# Patient Record
Sex: FEMALE | Race: BLACK OR AFRICAN AMERICAN | NOT HISPANIC OR LATINO | Employment: FULL TIME | URBAN - METROPOLITAN AREA
[De-identification: names, ages, dates, MRNs, and addresses within clinical notes are randomized per-mention and may not be internally consistent; named-entity substitution may affect disease eponyms.]

---

## 2017-04-12 ENCOUNTER — APPOINTMENT (OUTPATIENT)
Dept: LAB | Facility: HOSPITAL | Age: 32
End: 2017-04-12
Attending: MIDWIFE
Payer: COMMERCIAL

## 2017-04-12 ENCOUNTER — OFFICE VISIT (OUTPATIENT)
Dept: OBSTETRICS AND GYNECOLOGY | Facility: CLINIC | Age: 32
End: 2017-04-12
Payer: COMMERCIAL

## 2017-04-12 VITALS
HEIGHT: 66 IN | DIASTOLIC BLOOD PRESSURE: 70 MMHG | SYSTOLIC BLOOD PRESSURE: 122 MMHG | BODY MASS INDEX: 25.66 KG/M2 | WEIGHT: 159.63 LBS

## 2017-04-12 DIAGNOSIS — Z01.419 ENCOUNTER FOR GYNECOLOGICAL EXAMINATION WITH PAPANICOLAOU SMEAR OF CERVIX: Primary | ICD-10-CM

## 2017-04-12 DIAGNOSIS — Z30.09 ENCOUNTER FOR GENERAL COUNSELING AND ADVICE ON CONTRACEPTIVE MANAGEMENT: ICD-10-CM

## 2017-04-12 PROCEDURE — 88141 CYTOPATH C/V INTERPRET: CPT | Mod: ,,, | Performed by: PATHOLOGY

## 2017-04-12 PROCEDURE — 99385 PREV VISIT NEW AGE 18-39: CPT | Mod: S$GLB,,, | Performed by: MIDWIFE

## 2017-04-12 PROCEDURE — 88175 CYTOPATH C/V AUTO FLUID REDO: CPT | Performed by: PATHOLOGY

## 2017-04-12 PROCEDURE — 99999 PR PBB SHADOW E&M-NEW PATIENT-LVL II: CPT | Mod: PBBFAC,,, | Performed by: MIDWIFE

## 2017-04-12 RX ORDER — EPINEPHRINE 0.3 MG/.3ML
INJECTION INTRAMUSCULAR
Refills: 0 | COMMUNITY
Start: 2017-01-10 | End: 2019-09-11 | Stop reason: SDUPTHER

## 2017-04-12 RX ORDER — MONTELUKAST SODIUM 10 MG/1
TABLET ORAL
Refills: 1 | COMMUNITY
Start: 2017-03-28 | End: 2019-02-04

## 2017-04-12 NOTE — PROGRESS NOTES
Subjective:       Magalis Russell is a 31 y.o. female here for a routine exam and contraceptive counseling.  Current complaints: none.  Personal health questionnaire reviewed: yes. Pt had breast exam 2 months ago but had to leave and did not do PAP     Gynecologic History  Patient's last menstrual period was 2017 (approximate).  Contraception: none  Last Pap: . Results were: normal  Last mammogram: N/A. Results were: N/A    Obstetric History  OB History    Para Term  AB SAB TAB Ectopic Multiple Living   2 2 2       2      # Outcome Date GA Lbr Galindo/2nd Weight Sex Delivery Anes PTL Lv   2 Term 08 40w0d   F Vag-Spont   Y   1 Term 02 40w0d   M Vag-Spont   Y            The following portions of the patient's history were reviewed and updated as appropriate: allergies, current medications, past family history, past medical history, past social history, past surgical history and problem list.    Review of Systems  Pertinent items are noted in HPI.      Objective:      General appearance: alert, appears stated age, cooperative and no distress  Abdomen: normal findings: soft, non-tender  Pelvic: cervix normal in appearance, external genitalia normal, no adnexal masses or tenderness, no cervical motion tenderness, uterus normal size, shape, and consistency and vagina normal without discharge      Assessment:      Healthy female exam.      Plan:   Discussed R/B/A on IUD's  Also discussed BTL per pt request   Education reviewed: self breast exams.  Contraception: info given on Mirena, Kyleena, and Jolie, will decide and call.  Follow up in: 1 year.  Paper signed for IUD

## 2017-04-12 NOTE — MR AVS SNAPSHOT
"    [facility]  [address] [address] [address]  Phone: [phone]                  Magalis Russell   2017 7:45 AM   Office Visit    Description:  Female : 1985   Provider:  Panfilo Huertas CNM   Department:  [facility]           Reason for Visit     Gynecologic Exam           Diagnoses this Visit        Comments    Encounter for gynecological examination with Papanicolaou smear of cervix    -  Primary     Encounter for general counseling and advice on contraceptive management                To Do List           Goals (5 Years of Data)     None      [facility]     [facility] On Call Nurse Care Line -  Assistance  Unless otherwise directed by your provider, please contact [facility], our nurse care line that is available for  assistance.     Registered nurses in the [facility] On Call Center provide: appointment scheduling, clinical advisement, health education, and other advisory services.  Call: [phone] (toll free)               Medications           Message regarding Medications     Verify the changes and/or additions to your medication regime listed below are the same as discussed with your clinician today.  If any of these changes or additions are incorrect, please notify your healthcare provider.             Verify that the below list of medications is an accurate representation of the medications you are currently taking.  If none reported, the list may be blank. If incorrect, please contact your healthcare provider. Carry this list with you in case of emergency.           Current Medications     montelukast (SINGULAIR) 10 mg tablet     EPIPEN 2-MARCELA 0.3 mg/0.3 mL AtIn            Clinical Reference Information           Your Vitals Were     BP Height Weight Last Period BMI    122/70 5' 6" (1.676 m) 72.4 kg (159 lb 9.8 oz) 2017 (Approximate) 25.76 kg/m2      Blood Pressure          Most Recent Value    BP  122/70      Allergies as of 2017     " Oranges [Orange]    [place]      Immunizations Administered on Date of Encounter - 4/12/2017     None      Instructions      Breast Health: Breast Self-Awareness  What is breast self-awareness?  Breast self-awareness is knowing how your breasts normally look and feel. Your breasts change as you go through different stages of your life. So its important to learn what is normal for your breasts. Breast self-awareness helps you notice any changes in your breasts right away. Report any changes to your healthcare provider.  Why is breast self-awareness important?  Many experts now say that women should focus on breast self-awareness instead of doing a breast self-examination (BSE). These experts include the American Cancer Society, the U.S. Preventive Services Task Force, and the American Congress of Obstetricians and Gynecologists. Some experts even advise not teaching women to do a BSE. Thats because research hasnt shown a clear benefit to doing BSEs.  Breast self-awareness is different than a BSE. Breast self-awareness isnt about following a certain method and schedule. Its about knowing what's normal for your breasts. That way you can notice even small changes right away. If you see any changes, report them to your healthcare provider.  Changes to look for  Call your healthcare provider if you find any changes in your breasts that concern you. These changes may include:  · A lump  · Nipple discharge other than breast milk, especially a bloody discharge  · Swelling  · A change in size or shape  · Skin irritation, such as redness, thickening, or dimpling of the skin  · Swollen lymph nodes in the armpit  · Nipple problems, such as pain or redness  If you find a lump  Contact your provider if you find lumpiness in one breast, feel something different in the tissue, or feel a definite lump. Sometimes lumpiness may be due to menstrual changes. But there may be reason for concern.  Your provider may want to see you  right away if you have:  · Nipple discharge that is bloody  · Skin changes on your breast, such as dimpling or puckering  Its normal to be upset if you find a lump. But its important to contact your provider right away. Remember that most breast lumps are benign. This means they are not cancer.  Date Last Reviewed: 8/10/2015  © 5934-2109 [employer]. [address] [place] [address], [place] [address]. All rights reserved. This information is not intended as a substitute for professional medical care. Always follow your healthcare professional's instructions.        Clinical Breast Exam    Many health organizations recommend a yearly clinical breast exam. This exam may be done by a gynecologist, family healthcare provider, nurse practitioner, or specially trained nurse. Yearly breast exams help to make sure that breast conditions are found early.  Your healthcare providers role  A healthcare professional knows the tests and follow-up care needed if a problem is found. Your clinical exam is also a great time to ask questions about breast self-exams. You can find out if youre checking your breasts in the best way. Or you may want to ask how pregnancy, breast implants, or breast reduction surgery affect the way you should check your breasts.  Diagnostic tests  If a clinical exam reveals a breast change, you may have other tests to find out more. These tests may include:  · Mammography. A low-dose X-ray of your breast tissue.  · Ultrasound. An imaging test that uses sound waves to create images of your breast.  · Biopsy. A small amount of breast tissue is removed by needle or by a cut (incision). The tissue is then checked under a microscope.  Guidelines for having clinical breast exams  The American College of Obstetricians and Gynecologists recommends that starting at age 29, you should have a clinical breast exam every 1 to 3 years. After age 40, have a clinical breast exam each year. If youre at higher risk  for breast cancer, you may need exams more often. Risk factors for breast cancer may include:  · Being over 50 or postmenopausal  · Having a family history of breast cancer  · Having the BRCA1 or BRCA2 gene mutation or certain other gene mutations  · Having more menstrual periods due to starting menstruation early (before age 12) or having a late menopause (after age 55)  · Having no pregnancies  · Having a first pregnancy after age 30  · Being obese  · Having a history of radiation treatment to your chest area  · Exposure to JUANCHO during your mother's pregnancy  · Not being active  · Drinking too much alcohol  · Having dense breast tissue  · Taking hormone therapy after menopause  Other health organizations have different recommendations. Talk to your healthcare provider about what is best for you.   Date Last Reviewed: 8/13/2015 © 2000-2016 [employer]. [address] [place] [address], [place] [address]. All rights reserved. This information is not intended as a substitute for professional medical care. Always follow your healthcare professional's instructions.        Birth Control: IUD (Intrauterine Device)    The IUD (intrauterine device) is small, flexible, and T-shaped. It is placed in the uterus by a trained healthcare provider. The IUD is one of the most effective birth control methods. It is reversible, which means it can be removed at any time by a trained healthcare provider. New IUDs are safe and do not have the risks of older types of IUDs.  Pregnancy rates  Talk to your healthcare provider about the effectiveness of this birth control method.  Types of IUDs  IUD insertion is done in the healthcare providers office. Two types of IUDs are available:  · The copper IUD releases a small amount of copper into the uterus. The copper makes it harder for sperm to reach the egg. The device works for at least 10 years.  · The progestin IUD releases a hormone called progestin. It causes changes in the uterus  to help prevent pregnancy. The device works for 3 to 5 years, depending on which device is chosen. It may be recommended for women who have anemia or heavy and painful periods.  IUDs have thin strings that hang from the opening of the uterus into the vagina. This lets you check that the IUD stays in place.  Things to know about IUDs  · Can be used by women who have never been pregnant or by women with a history of sexually transmitted infections (STIs) or tubal pregnancy.  · Wont move from the uterus to any other part of the body.  · Pose a slight risk of the device coming out of the vagina (expulsion).  · May not work in women who have an abnormally-shaped uterus.  · Copper IUD may cause heavier periods and cramping.  · Progestin IUD may cause light periods or no periods at all (irregular bleeding or spotting is possible and normal during first 3 to 6 months).  · If a sexually transmitted infection is acquired with an IUD in place symptoms may be more severe.  Be sure your healthcare provider knows if you have:  · A sexually transmitted infection (STI) or possible STI  · Liver problems  · Blood clots (for progestin IUD only)  · Breast cancer or a history of breast cancer (progestin IUD only)   Date Last Reviewed: 5/12/2015  © 2522-7148 [employer]. [address] [place] [address]. All rights reserved. This information is not intended as a substitute for professional medical care. Always follow your healthcare professional's instructions.             Language Assistance Services     ATTENTION: Language assistance services are available, free of charge. Please call [phone].      ATENCIÓN: Si habla kimi, tiene a simon disposición servicios gratuitos de asistencia lingüística. Llame al [phone].     [facility] Ý: N?u b?n nói Ti?ng Vi?t, có các d?ch v? h? tr? ngôn ng? mi?n phí dành cho b?n. G?i s? [phone].         [facility] complies with applicable Federal civil rights laws  and does not discriminate on the basis of race, color, national origin, age, disability, or sex.

## 2017-04-12 NOTE — PATIENT INSTRUCTIONS
Breast Health: Breast Self-Awareness  What is breast self-awareness?  Breast self-awareness is knowing how your breasts normally look and feel. Your breasts change as you go through different stages of your life. So its important to learn what is normal for your breasts. Breast self-awareness helps you notice any changes in your breasts right away. Report any changes to your healthcare provider.  Why is breast self-awareness important?  Many experts now say that women should focus on breast self-awareness instead of doing a breast self-examination (BSE). These experts include the American Cancer Society, the U.S. Preventive Services Task Force, and the American Congress of Obstetricians and Gynecologists. Some experts even advise not teaching women to do a BSE. Thats because research hasnt shown a clear benefit to doing BSEs.  Breast self-awareness is different than a BSE. Breast self-awareness isnt about following a certain method and schedule. Its about knowing what's normal for your breasts. That way you can notice even small changes right away. If you see any changes, report them to your healthcare provider.  Changes to look for  Call your healthcare provider if you find any changes in your breasts that concern you. These changes may include:  · A lump  · Nipple discharge other than breast milk, especially a bloody discharge  · Swelling  · A change in size or shape  · Skin irritation, such as redness, thickening, or dimpling of the skin  · Swollen lymph nodes in the armpit  · Nipple problems, such as pain or redness  If you find a lump  Contact your provider if you find lumpiness in one breast, feel something different in the tissue, or feel a definite lump. Sometimes lumpiness may be due to menstrual changes. But there may be reason for concern.  Your provider may want to see you right away if you have:  · Nipple discharge that is bloody  · Skin changes on your breast, such as dimpling or puckering  Its  normal to be upset if you find a lump. But its important to contact your provider right away. Remember that most breast lumps are benign. This means they are not cancer.  Date Last Reviewed: 8/10/2015  © 6561-1674 [employer]. [address] [place] [address]. All rights reserved. This information is not intended as a substitute for professional medical care. Always follow your healthcare professional's instructions.        Clinical Breast Exam    Many health organizations recommend a yearly clinical breast exam. This exam may be done by a gynecologist, family healthcare provider, nurse practitioner, or specially trained nurse. Yearly breast exams help to make sure that breast conditions are found early.  Your healthcare providers role  A healthcare professional knows the tests and follow-up care needed if a problem is found. Your clinical exam is also a great time to ask questions about breast self-exams. You can find out if youre checking your breasts in the best way. Or you may want to ask how pregnancy, breast implants, or breast reduction surgery affect the way you should check your breasts.  Diagnostic tests  If a clinical exam reveals a breast change, you may have other tests to find out more. These tests may include:  · Mammography. A low-dose X-ray of your breast tissue.  · Ultrasound. An imaging test that uses sound waves to create images of your breast.  · Biopsy. A small amount of breast tissue is removed by needle or by a cut (incision). The tissue is then checked under a microscope.  Guidelines for having clinical breast exams  The American College of Obstetricians and Gynecologists recommends that starting at age 29, you should have a clinical breast exam every 1 to 3 years. After age 40, have a clinical breast exam each year. If youre at higher risk for breast cancer, you may need exams more often. Risk factors for breast cancer may include:  · Being over 50 or  postmenopausal  · Having a family history of breast cancer  · Having the BRCA1 or BRCA2 gene mutation or certain other gene mutations  · Having more menstrual periods due to starting menstruation early (before age 12) or having a late menopause (after age 55)  · Having no pregnancies  · Having a first pregnancy after age 30  · Being obese  · Having a history of radiation treatment to your chest area  · Exposure to JUANCHO during your mother's pregnancy  · Not being active  · Drinking too much alcohol  · Having dense breast tissue  · Taking hormone therapy after menopause  Other health organizations have different recommendations. Talk to your healthcare provider about what is best for you.   Date Last Reviewed: 8/13/2015 © 2000-2016 [employer]. [address] [place] [address], [place] [address]. All rights reserved. This information is not intended as a substitute for professional medical care. Always follow your healthcare professional's instructions.        Birth Control: IUD (Intrauterine Device)    The IUD (intrauterine device) is small, flexible, and T-shaped. It is placed in the uterus by a trained healthcare provider. The IUD is one of the most effective birth control methods. It is reversible, which means it can be removed at any time by a trained healthcare provider. New IUDs are safe and do not have the risks of older types of IUDs.  Pregnancy rates  Talk to your healthcare provider about the effectiveness of this birth control method.  Types of IUDs  IUD insertion is done in the healthcare providers office. Two types of IUDs are available:  · The copper IUD releases a small amount of copper into the uterus. The copper makes it harder for sperm to reach the egg. The device works for at least 10 years.  · The progestin IUD releases a hormone called progestin. It causes changes in the uterus to help prevent pregnancy. The device works for 3 to 5 years, depending on which device is chosen. It may be  recommended for women who have anemia or heavy and painful periods.  IUDs have thin strings that hang from the opening of the uterus into the vagina. This lets you check that the IUD stays in place.  Things to know about IUDs  · Can be used by women who have never been pregnant or by women with a history of sexually transmitted infections (STIs) or tubal pregnancy.  · Wont move from the uterus to any other part of the body.  · Pose a slight risk of the device coming out of the vagina (expulsion).  · May not work in women who have an abnormally-shaped uterus.  · Copper IUD may cause heavier periods and cramping.  · Progestin IUD may cause light periods or no periods at all (irregular bleeding or spotting is possible and normal during first 3 to 6 months).  · If a sexually transmitted infection is acquired with an IUD in place symptoms may be more severe.  Be sure your healthcare provider knows if you have:  · A sexually transmitted infection (STI) or possible STI  · Liver problems  · Blood clots (for progestin IUD only)  · Breast cancer or a history of breast cancer (progestin IUD only)   Date Last Reviewed: 5/12/2015  © 8245-4962 [employer]. [address] [place] [address], [place] [address]. All rights reserved. This information is not intended as a substitute for professional medical care. Always follow your healthcare professional's instructions.

## 2017-06-07 ENCOUNTER — OFFICE VISIT (OUTPATIENT)
Dept: OBSTETRICS AND GYNECOLOGY | Facility: CLINIC | Age: 32
End: 2017-06-07
Payer: COMMERCIAL

## 2017-06-07 VITALS
BODY MASS INDEX: 25.97 KG/M2 | HEIGHT: 66 IN | DIASTOLIC BLOOD PRESSURE: 60 MMHG | WEIGHT: 161.63 LBS | SYSTOLIC BLOOD PRESSURE: 112 MMHG

## 2017-06-07 DIAGNOSIS — O26.841 UTERINE SIZE-DATE DISCREPANCY, FIRST TRIMESTER: ICD-10-CM

## 2017-06-07 DIAGNOSIS — Z32.01 POSITIVE PREGNANCY TEST: Primary | ICD-10-CM

## 2017-06-07 DIAGNOSIS — N91.2 AMENORRHEA: ICD-10-CM

## 2017-06-07 PROCEDURE — 87591 N.GONORRHOEAE DNA AMP PROB: CPT

## 2017-06-07 PROCEDURE — 99999 PR PBB SHADOW E&M-EST. PATIENT-LVL III: CPT | Mod: PBBFAC,,, | Performed by: MIDWIFE

## 2017-06-07 PROCEDURE — 81025 URINE PREGNANCY TEST: CPT | Mod: QW,S$GLB,, | Performed by: MIDWIFE

## 2017-06-07 PROCEDURE — 99212 OFFICE O/P EST SF 10 MIN: CPT | Mod: S$GLB,,, | Performed by: MIDWIFE

## 2017-06-07 NOTE — PROGRESS NOTES
"Subjective:      Magalis Russell is a 31 y.o. female who presents for evaluation of amenorrhea. She believes she could be pregnant. Pregnancy is not desired. Sexual Activity: single partner, contraception: none. Current symptoms also include: positive home pregnancy test. Last period was normal. Pt tearful and unsure if wants to keep pregnancy. Is going on a trip to the [facility] next week.     Patient's last menstrual period was 04/22/2017 (approximate).  The following portions of the patient's history were reviewed and updated as appropriate: allergies, current medications, past family history, past medical history, past social history, past surgical history and problem list.    Review of Systems  Pertinent items are noted in HPI.       Objective:      /60   Ht 5' 6" (1.676 m)   Wt 73.3 kg (161 lb 9.6 oz)   LMP 04/22/2017 (Approximate)   BMI 26.08 kg/m²   General: alert, cooperative, no distress and tearful      Lab Review  Urine HCG: positive      Assessment:      Absence of menstruation.       Plan:     Had long discussion on pt options regarding termination vs keeping the pregnancy  Pt will call when returns from vacation and let us know of her plan, pt reassured and comforted  "

## 2017-06-08 LAB
C TRACH DNA SPEC QL NAA+PROBE: NOT DETECTED
N GONORRHOEA DNA SPEC QL NAA+PROBE: NOT DETECTED

## 2017-07-17 ENCOUNTER — TELEPHONE (OUTPATIENT)
Dept: OBSTETRICS AND GYNECOLOGY | Facility: CLINIC | Age: 32
End: 2017-07-17

## 2017-07-17 ENCOUNTER — PATIENT MESSAGE (OUTPATIENT)
Dept: OBSTETRICS AND GYNECOLOGY | Facility: CLINIC | Age: 32
End: 2017-07-17

## 2017-07-17 NOTE — TELEPHONE ENCOUNTER
----- Message from Theron Varner sent at 7/17/2017 12:27 PM CDT -----  Contact: pt   States she is calling to have  call her back rg her last visit and having some questions about it and can be reached at [phone]//thanks/dbw

## 2017-07-17 NOTE — TELEPHONE ENCOUNTER
----- Message from Connie Graves sent at 7/17/2017  3:56 PM CDT -----  returned call...[phone] (home)

## 2017-07-31 ENCOUNTER — LAB VISIT (OUTPATIENT)
Dept: LAB | Facility: HOSPITAL | Age: 32
End: 2017-07-31
Attending: FAMILY MEDICINE
Payer: COMMERCIAL

## 2017-07-31 ENCOUNTER — OFFICE VISIT (OUTPATIENT)
Dept: INTERNAL MEDICINE | Facility: CLINIC | Age: 32
End: 2017-07-31
Payer: COMMERCIAL

## 2017-07-31 VITALS
TEMPERATURE: 96 F | DIASTOLIC BLOOD PRESSURE: 85 MMHG | HEIGHT: 66 IN | OXYGEN SATURATION: 100 % | HEART RATE: 86 BPM | BODY MASS INDEX: 25.51 KG/M2 | RESPIRATION RATE: 18 BRPM | SYSTOLIC BLOOD PRESSURE: 140 MMHG | WEIGHT: 158.75 LBS

## 2017-07-31 DIAGNOSIS — J30.2 CHRONIC SEASONAL ALLERGIC RHINITIS, UNSPECIFIED TRIGGER: ICD-10-CM

## 2017-07-31 DIAGNOSIS — Z00.00 ROUTINE HEALTH MAINTENANCE: Primary | ICD-10-CM

## 2017-07-31 DIAGNOSIS — Z00.00 ROUTINE HEALTH MAINTENANCE: ICD-10-CM

## 2017-07-31 DIAGNOSIS — F41.9 ANXIETY: ICD-10-CM

## 2017-07-31 LAB
ALBUMIN SERPL BCP-MCNC: 3.5 G/DL
ALP SERPL-CCNC: 58 U/L
ALT SERPL W/O P-5'-P-CCNC: 12 U/L
ANION GAP SERPL CALC-SCNC: 8 MMOL/L
AST SERPL-CCNC: 13 U/L
BILIRUB SERPL-MCNC: 0.8 MG/DL
BUN SERPL-MCNC: 15 MG/DL
CALCIUM SERPL-MCNC: 9 MG/DL
CHLORIDE SERPL-SCNC: 109 MMOL/L
CHOLEST/HDLC SERPL: 2.5 {RATIO}
CO2 SERPL-SCNC: 22 MMOL/L
CREAT SERPL-MCNC: 0.8 MG/DL
EST. GFR  (AFRICAN AMERICAN): >60 ML/MIN/1.73 M^2
EST. GFR  (NON AFRICAN AMERICAN): >60 ML/MIN/1.73 M^2
GLUCOSE SERPL-MCNC: 99 MG/DL
HDL/CHOLESTEROL RATIO: 40.5 %
HDLC SERPL-MCNC: 121 MG/DL
HDLC SERPL-MCNC: 49 MG/DL
LDLC SERPL CALC-MCNC: 53 MG/DL
NONHDLC SERPL-MCNC: 72 MG/DL
POTASSIUM SERPL-SCNC: 3.8 MMOL/L
PROT SERPL-MCNC: 7.1 G/DL
SODIUM SERPL-SCNC: 139 MMOL/L
TRIGL SERPL-MCNC: 95 MG/DL

## 2017-07-31 PROCEDURE — 80061 LIPID PANEL: CPT

## 2017-07-31 PROCEDURE — 99385 PREV VISIT NEW AGE 18-39: CPT | Mod: S$GLB,,, | Performed by: FAMILY MEDICINE

## 2017-07-31 PROCEDURE — 99999 PR PBB SHADOW E&M-EST. PATIENT-LVL IV: CPT | Mod: PBBFAC,,, | Performed by: FAMILY MEDICINE

## 2017-07-31 PROCEDURE — 80053 COMPREHEN METABOLIC PANEL: CPT | Mod: PO

## 2017-07-31 PROCEDURE — 36415 COLL VENOUS BLD VENIPUNCTURE: CPT | Mod: PO

## 2017-07-31 NOTE — PROGRESS NOTES
Subjective:       Patient ID: Magalis Russell is a 31 y.o. female.    Chief Complaint: Establish Care and Sinus Problem    HPI  Here today to establish care.  No acute complaints at this time but she is interested in having a routine physical.  She was pregnant earlier this year but had a termination and is now on birth control.  One of her main concerns is frequent ALLERGY symptoms.  She has tried multiple therapies of over-the-counter remedies without much relief.  She is currently using Singulair.  In the past she take his steroid injections but without only had about 2 weeks of relief.  Has never had any sort of suppressive therapy for ALLERGIES.  Did consult with an ENT and allergist and reportedly has some skin testing that showed strawberries had a significant response but she is not sure of the rest of these results.    Did report a couple episodes of tachycardia or palpitations that lasted about a minute and were not associated with any dizziness nor shortness of breath nor chest pain.  Whenever they came on she simply sat down and they went away.  She is not sure what stimulated this.  Says that she sleeps pretty well.  She does drink about 2-3 20 ounce [employer] products on a daily basis.    She works at a .    Family History   Problem Relation Age of Onset    Heart failure Mother     Aneurysm Father     Breast cancer Sister        Current Outpatient Prescriptions:     EPIPEN 2-MARCELA 0.3 mg/0.3 mL AtIn, , Disp: , Rfl: 0    montelukast (SINGULAIR) 10 mg tablet, , Disp: , Rfl: 1    NORGESTIMATE-ETHINYL ESTRADIOL (SPRINTEC, 28, ORAL), Take 1 tablet by mouth once daily., Disp: , Rfl:     Review of Systems   Constitutional: Negative for chills and fever.   HENT: Negative for congestion and sore throat.    Eyes: Negative for visual disturbance.   Respiratory: Negative for cough and shortness of breath.    Cardiovascular: Negative for chest pain.   Gastrointestinal: Negative for abdominal pain,  "constipation and diarrhea.   Endocrine: Negative for polydipsia and polyuria.   Genitourinary: Negative for difficulty urinating and menstrual problem.   Skin: Negative for rash.   Neurological: Negative for dizziness.   Hematological: Does not bruise/bleed easily.       Objective:   BP (!) 140/85 (BP Location: Left arm, Patient Position: Sitting, BP Method: Automatic)   Pulse 86   Temp 96 °F (35.6 °C) (Tympanic)   Resp 18   Ht 5' 6" (1.676 m)   Wt 72 kg (158 lb 11.7 oz)   LMP 07/02/2017   SpO2 100%   BMI 25.62 kg/m²      Physical Exam   Constitutional: She is oriented to person, place, and time. She appears well-developed and well-nourished. No distress.   HENT:   Head: Normocephalic and atraumatic.   Nose: Nose normal.   Eyes: Conjunctivae and EOM are normal. Pupils are equal, round, and reactive to light. Right eye exhibits no discharge. Left eye exhibits no discharge.   Neck: No thyromegaly present.   Cardiovascular: Normal rate and regular rhythm.    No murmur heard.  Pulmonary/Chest: Effort normal and breath sounds normal. No respiratory distress.   Abdominal: Soft. She exhibits no distension.   Musculoskeletal: She exhibits no edema.   Neurological: She is alert and oriented to person, place, and time. Coordination normal.   Skin: Skin is warm and dry. No rash noted. She is not diaphoretic.   Psychiatric: She has a normal mood and affect. Her behavior is normal.   Vitals reviewed.      Assessment & Plan     1. Routine health maintenance  No significant concerns from history and physical exam.  She had borderline blood pressure today but in the last 2 exams it was actually normal.  We'll get lipid screening as well as comprehensive metabolic panel.  - Lipid panel; Future  - Comprehensive metabolic panel; Future    2. Chronic seasonal allergic rhinitis, unspecified trigger  Due to the fact that she has significant symptoms on a near daily basis and has been resistant to taking many of the " over-the-counter remedies and says that she needed steroids in the past I recommended that she consult with allergist to see if there is something different we can do to help suppress her symptoms.  - Ambulatory Referral to Allergy    3. Anxiety  Reports that her anxiety is intermittent and not really affecting her life.  We'll address further in the future if need be.  Not sure if the tachycardia symptoms that she was describing could've been secondary to this but if this persists or she has more symptoms of palpitations I would recommend that we investigate further

## 2017-07-31 NOTE — MEDICAL/APP STUDENT
[facility] Internal Medicine  History & Physical      SUBJECTIVE:     Chief Complaint/Reason for Admission: [facility]    History of Present Illness:  Patient is a 31 y.o. female with a history of seasonal allergies and anxiety who presented to [facility] and have a wellness exam. She has a couple minor complaints, including two recent episodes of palpitations and long-standing seasonal allergies.    The palpitations occurred twice over this past year, lasted less than a minute, and were associated with minor exertion (cleaning the house). They resolved when she took a seat. She had no chest pain or shortness of breath during these episodes. She has had similar, but more minor episodes in the past. She acknowledges a past history of panic attacks and minor general anxiety. She has never taken medication for her anxiety.    She has had year-round seasonal allergies since she was in her teens. Her symptoms include headache 3-4 days of the week, runny nose, and congestion. She takes benadryl and ibuprofen most days of the week. She has tried 'everything' for her allergies, including zyrtec, flonase, and saline washes, with no relief. She has had steroid shots in the past which give roughly two weeks of relief. She has been seen by an allergist and an ENT, but they did not offer any management options beyond what her generalist offered. She has had allergen sensitivity testing in the past, but never any desensitization therapy.    Review of patient's allergies indicates:   Allergen Reactions    Strawberry Shortness Of Breath and Blisters    Oranges [orange]     Wheat containing prod Hives       Past Medical History:   Diagnosis Date    Seasonal allergies      No past surgical history on file.  Family History   Problem Relation Age of Onset    Heart failure Mother     Aneurysm Father     Breast cancer Sister      Social History   Substance Use Topics    Smoking status: Never Smoker    Smokeless tobacco:  "Never Used    Alcohol use No        Review of Systems:  Constitutional- No fatigue. No fever.  ENT- Runny nose present. No epistaxis. No sore throat.   CV- No chest pain. No edema. No orthopnea.  Resp- No SOB. No cough.  GI- No abdominal pain. No changes in bowl motions. No nausea or vomiting.  - No pain with urination or changes in color or frequency.   MSK- No chronic pain. No recent trauma.  Neuro- Headache 3-4 days a week.  Psych- Some anxiety. No depression.    OBJECTIVE:     Vital Signs (Most Recent):  Temp: 96 °F (35.6 °C) (07/31/17 0833)  Pulse: 86 (07/31/17 0833)  Resp: 18 (07/31/17 0833)  BP: (!) 140/85 (07/31/17 0833)  SpO2: 100 % (07/31/17 0833)    Last 3 sets of Vitals    Vitals - 1 value per visit 4/12/2017 6/7/2017 7/31/2017   SYSTOLIC 122 112 140   DIASTOLIC 70 60 85   PULSE - - 86   TEMPERATURE - - 96   RESPIRATIONS - - 18   SPO2 - - 100   Weight (lb) 159.61 161.6 158.73   Weight (kg) 72.4 73.3 72   HEIGHT 5' 6" 5' 6" 5' 6"   BODY MASS INDEX 25.76 26.08 25.62   VISIT REPORT - - -   Pain Score  - - 8       Physical Exam:  General- Patient alert and oriented x3 in NAD  HEENT- NC/AT, PERRLA, MMM  Neck- No JVD, Lymphadenopathy, Thyromegaly  CV- Regular rate and rhythm, No Murmur/jeffrey/rubs  Resp- Lungs CTA Bilaterally, No increased WOB  Abdomen- Non tender/non-distended, BS normoactive x4 quads, no HSM  Extrem- No cyanosis, clubbing, edema. Pulses 2+ and symmetric  Skin- No rashes, lesions, ulcers    Laboratory Data:      Diagnostic Results:      ASSESSMENT/PLAN:     Ms. Magalis Russell is a 31 y.o. who appears well.    #Seasonal allergies: Referral to [facility] allergist for evaluation of treatment options.    #Palpitations: Patient appears well, low index of suspicion for serious pathology. Monitor.    #Anxiety: Monitor, patient is not concerned enough to warrant medical therapy at this time.    #Health maintenance: Lipid panel.    Follow up: 1 year    Edward Misulis [employer] Medical Student    "

## 2017-07-31 NOTE — PATIENT INSTRUCTIONS
Prevention Guidelines, Women Ages 18 to 39  Screening tests and vaccines are an important part of managing your health. Health counseling is essential, too. Below are guidelines for these, for women ages 18 to 39. Talk with your healthcare provider to make sure youre up-to-date on what you need.  Screening Who needs it How often   Alcohol misuse All women in this age group At routine exams   Blood pressure All women in this age group Every 2 years if your blood pressure is less than 120/80 mm Hg; yearly if your systolic blood pressure is 120 to 139 mm Hg, or your diastolic blood pressure reading is 80 to 89 mm Hg   Breast cancer All women in this age group should talk with their healthcare providers about the need for clinical breast exams (CBE)1 Clinical breast exam every 3 years1   Cervical cancer Women ages 21 and older Women between ages 21 and 29 should have a Pap test every 3 years; women between ages 30 and 65 are advised to have a Pap test plus an HPV test every 5 years   Chlamydia Sexually active women ages 24 and younger, and women at increased risk for infection Every 3 years if you're at risk or have symptoms   Depression All women in this age group At routine exams   Diabetes mellitus, type 2 Adults with no symptoms who are overweight or obese and have 1 or more other risk factors for diabetes At least every 3 years   Gonorrhea Sexually active women at increased risk for infection At routine exams   Hepatitis C Anyone at increased risk At routine exams   HIV All women At routine exams   Obesity All women in this age group At routine exams   Syphilis Women at increased risk for infection - talk with your healthcare provider At routine exams   Tuberculosis Women at increased risk for infection - talk with your healthcare provider Ask your healthcare provider   Vision All women in this age group At least 1 complete exam in your 20s, and 2 in your 30s   Vaccine2 Who needs it How often   Chickenpox  (varicella) All women in this age group who have no record of this infection or vaccine 2 doses; the second dose should be given 4 to 8 weeks after the first dose   Hepatitis A Women at increased risk for infection - talk with your healthcare provider 2 doses given at least 6 months apart   Hepatitis B Women at increased risk for infection - talk with your healthcare provider 3 doses over 6 months; second dose should be given 1 month after the first dose; the third dose should be given at least 2 months after the second dose and at least 4 months after the first dose   Haemophilus influenzae Type B (HIB) Women at increased risk for infection - talk with your healthcare provider 1 to 3 doses   Human papillomavirus (HPV) All women in this age group up to age 26 3 doses; the second dose should be given 1 to 2 months after the first dose and the third dose given 6 months after the first dose   Influenza (flu) All women in this age group Once a year   Measles, mumps, rubella (MMR) All women in this age group who have no record of these infections or vaccines 1 or 2 doses   Meningococcal Women at increased risk for infection - talk with your healthcare provider 1 or more doses   Pneumococcal conjugate vaccine (PCV13) and pneumococcal polysaccharide vaccine (PPSV23) Women at increased risk for infection - talk with your healthcare provider PCV13: 1 dose ages 19 to 65 (protects against 13 types of pneumococcal bacteria)  PPSV23: 1 to 2 doses through age 64, or 1 dose at 65 or older (protects against 23 types of pneumococcal bacteria)      Tetanus/diphtheria/pertussis (Td/Tdap) booster All women in this age group Td every 10 years, or a one-time dose of Tdap instead of a Td booster after age 18, then Td every 10 years   Counseling Who needs it How often   BRCA gene mutation testing for breast and ovarian cancer susceptibility Women with increased risk for having gene mutation When your risk is known   Breast cancer and  chemoprevention Women at high risk for breast cancer When your risk is known   Diet and exercise Women who are overweight or obese When diagnosed, and then at routine exams   Domestic violence Women at the age in which they are able to have children At routine exams   Sexually transmitted infection prevention Women who are sexually active At routine exams   Skin cancer Prevention of skin cancer in fair-skinned adults through age 24 At routine exams   Use of tobacco and the health effects it can cause All women in this age group Every visit   1According to the ACS, women ages 20 to 39 years should have a clinical breast exam (CBE) as part of their routine health exam every 3 years, and breast self-exams are an option for women starting in their 20s. However, the  USPSTF does not recommend CBE.  2Those who are 18 years of age, who are not up-to-date on their childhood immunizations, should receive all appropriate catch-up vaccines recommended by the CDC.  Date Last Reviewed: 8/27/2015  © 9066-4776 [employer]. [address] [place] [address], [place] [address]. All rights reserved. This information is not intended as a substitute for professional medical care. Always follow your healthcare professional's instructions.

## 2017-08-01 ENCOUNTER — TELEPHONE (OUTPATIENT)
Dept: INTERNAL MEDICINE | Facility: CLINIC | Age: 32
End: 2017-08-01

## 2017-08-01 NOTE — TELEPHONE ENCOUNTER
----- Message from Emmanuel Coulter, DO sent at 8/1/2017  8:06 AM CDT -----  Please inform patient that all of the labs are normal if he/she does not have [facility] activated. If there are any questions please let me know.

## 2018-08-30 ENCOUNTER — PATIENT MESSAGE (OUTPATIENT)
Dept: OBSTETRICS AND GYNECOLOGY | Facility: CLINIC | Age: 33
End: 2018-08-30

## 2018-08-30 ENCOUNTER — TELEPHONE (OUTPATIENT)
Dept: OBSTETRICS AND GYNECOLOGY | Facility: CLINIC | Age: 33
End: 2018-08-30

## 2018-08-30 NOTE — TELEPHONE ENCOUNTER
----- Message from Lizzette Cano sent at 8/30/2018  9:02 AM CDT -----  Contact: NCR [employer]  ----- Message from [facility], System Message sent at 8/30/2018  9:00 AM CDT -----    Appointment Request From: Magalis Russell    With Provider: Panfilo Huertas CNM [[facility] - OB-GYN]    Preferred Date Range: 9/3/2018 - 9/4/2018    Preferred Times: Monday Morning, Monday Afternoon, Tuesday Morning, Tuesday Afternoon    Reason for visit: Existing Patient    Comments:  Pap

## 2018-09-26 ENCOUNTER — OFFICE VISIT (OUTPATIENT)
Dept: INTERNAL MEDICINE | Facility: CLINIC | Age: 33
End: 2018-09-26
Payer: COMMERCIAL

## 2018-09-26 VITALS
HEIGHT: 67 IN | SYSTOLIC BLOOD PRESSURE: 116 MMHG | RESPIRATION RATE: 16 BRPM | BODY MASS INDEX: 25.47 KG/M2 | WEIGHT: 162.25 LBS | HEART RATE: 81 BPM | TEMPERATURE: 98 F | DIASTOLIC BLOOD PRESSURE: 80 MMHG | OXYGEN SATURATION: 98 %

## 2018-09-26 DIAGNOSIS — R35.0 URINARY FREQUENCY: ICD-10-CM

## 2018-09-26 DIAGNOSIS — Z00.00 ROUTINE HEALTH MAINTENANCE: Primary | ICD-10-CM

## 2018-09-26 LAB
BILIRUB SERPL-MCNC: NEGATIVE MG/DL
BLOOD URINE, POC: 50
COLOR, POC UA: YELLOW
GLUCOSE UR QL STRIP: NORMAL
KETONES UR QL STRIP: NEGATIVE
LEUKOCYTE ESTERASE URINE, POC: 1
NITRITE, POC UA: NEGATIVE
PH, POC UA: 6
PROTEIN, POC: ABNORMAL
SPECIFIC GRAVITY, POC UA: 1.02
UROBILINOGEN, POC UA: NORMAL

## 2018-09-26 PROCEDURE — 81002 URINALYSIS NONAUTO W/O SCOPE: CPT | Mod: S$GLB,,, | Performed by: FAMILY MEDICINE

## 2018-09-26 PROCEDURE — 99395 PREV VISIT EST AGE 18-39: CPT | Mod: 25,S$GLB,, | Performed by: FAMILY MEDICINE

## 2018-09-26 PROCEDURE — 99999 PR PBB SHADOW E&M-EST. PATIENT-LVL IV: CPT | Mod: PBBFAC,,, | Performed by: FAMILY MEDICINE

## 2018-09-26 PROCEDURE — 87086 URINE CULTURE/COLONY COUNT: CPT

## 2018-09-26 NOTE — ASSESSMENT & PLAN NOTE
Recommended cutting back on cokes and starting to exercise on a routine basis for cardiovascular health. No need for labs as everything was normal last year and she is on no medications that need monitoring.

## 2018-09-26 NOTE — PROGRESS NOTES
"Subjective:       Patient ID: Magalis Russell is a 33 y.o. female.    Chief Complaint: Annual Exam and Urinary Frequency    HPI  Here today for routine exam. No complaints at this time other than some urinary frequency. Had similar symptoms in the past with UTI. No dysuria. No hematuria. No fever, nausea/chills.     Will get flu shot today  I reviewed last years labs which were all normal.     She does drink a lot of cokes.  Little exercise.     Family History   Problem Relation Age of Onset    Heart failure Mother     Aneurysm Father     Breast cancer Sister        Current Outpatient Medications:     EPIPEN 2-MARCELA 0.3 mg/0.3 mL AtIn, , Disp: , Rfl: 0    montelukast (SINGULAIR) 10 mg tablet, , Disp: , Rfl: 1    NORGESTIMATE-ETHINYL ESTRADIOL (SPRINTEC, 28, ORAL), Take 1 tablet by mouth once daily., Disp: , Rfl:     Review of Systems   Constitutional: Negative for chills and fever.   HENT: Negative for congestion and sore throat.    Eyes: Negative for visual disturbance.   Respiratory: Negative for cough and shortness of breath.    Cardiovascular: Negative for chest pain.   Gastrointestinal: Negative for abdominal pain, constipation and diarrhea.   Endocrine: Negative for polydipsia and polyuria.   Genitourinary: Positive for frequency. Negative for difficulty urinating and menstrual problem.   Skin: Negative for rash.   Neurological: Negative for dizziness.   Hematological: Does not bruise/bleed easily.       Objective:   /80 (BP Location: Right arm, Patient Position: Sitting, BP Method: Medium (Manual))   Pulse 81   Temp 98.3 °F (36.8 °C) (Oral)   Resp 16   Ht 5' 7.2" (1.707 m)   Wt 73.6 kg (162 lb 4.1 oz)   LMP 09/03/2018 (Exact Date)   SpO2 98%   BMI 25.26 kg/m²      Physical Exam   Constitutional: She is oriented to person, place, and time. She appears well-developed and well-nourished. No distress.   HENT:   Head: Normocephalic and atraumatic.   Nose: Nose normal.   Eyes: Conjunctivae and EOM " are normal. Pupils are equal, round, and reactive to light. Right eye exhibits no discharge. Left eye exhibits no discharge.   Neck: No thyromegaly present.   Cardiovascular: Normal rate and regular rhythm.   No murmur heard.  Pulmonary/Chest: Effort normal and breath sounds normal. No respiratory distress.   Abdominal: Soft. She exhibits no distension.   Musculoskeletal: She exhibits no edema.   Neurological: She is alert and oriented to person, place, and time.   Skin: No rash noted. She is not diaphoretic.   Psychiatric: She has a normal mood and affect. Her behavior is normal.       Assessment & Plan     Problem List Items Addressed This Visit        Renal/    Urinary frequency    Current Assessment & Plan     Urine dip not clear. Will send for culture since symptoms are not classic for UTI         Relevant Orders    POCT URINE DIPSTICK WITHOUT MICROSCOPE (Completed)    CULTURE, URINE       Other    Routine health maintenance - Primary    Current Assessment & Plan     Recommended cutting back on cokes and starting to exercise on a routine basis for cardiovascular health. No need for labs as everything was normal last year and she is on no medications that need monitoring.                 Follow-up in about 1 year (around 9/26/2019).

## 2018-09-26 NOTE — PATIENT INSTRUCTIONS
Prevention Guidelines, Women Ages 18 to 39  Screening tests and vaccines are an important part of managing your health. Health counseling is essential, too. Below are guidelines for these, for women ages 18 to 39. Talk with your healthcare provider to make sure youre up-to-date on what you need.  Screening Who needs it How often   Alcohol misuse All women in this age group At routine exams   Blood pressure All women in this age group Every 2 years if your blood pressure is less than 120/80 mm Hg; yearly if your systolic blood pressure is 120 to 139 mm Hg, or your diastolic blood pressure reading is 80 to 89 mm Hg   Breast cancer All women in this age group should talk with their healthcare providers about the need for clinical breast exams (CBE)1 Clinical breast exam every 3 years1   Cervical cancer Women ages 21 and older Women between ages 21 and 29 should have a Pap test every 3 years; women between ages 30 and 65 are advised to have a Pap test plus an HPV test every 5 years   Chlamydia Sexually active women ages 24 and younger, and women at increased risk for infection Every 3 years if you're at risk or have symptoms   Depression All women in this age group At routine exams   Diabetes mellitus, type 2 Adults with no symptoms who are overweight or obese and have 1 or more other risk factors for diabetes At least every 3 years   Gonorrhea Sexually active women at increased risk for infection At routine exams   Hepatitis C Anyone at increased risk At routine exams   HIV All women At routine exams   Obesity All women in this age group At routine exams   Syphilis Women at increased risk for infection - talk with your healthcare provider At routine exams   Tuberculosis Women at increased risk for infection - talk with your healthcare provider Ask your healthcare provider   Vision All women in this age group At least 1 complete exam in your 20s, and 2 in your 30s   Vaccine2 Who needs it How often   Chickenpox  (varicella) All women in this age group who have no record of this infection or vaccine 2 doses; the second dose should be given 4 to 8 weeks after the first dose   Hepatitis A Women at increased risk for infection - talk with your healthcare provider 2 doses given at least 6 months apart   Hepatitis B Women at increased risk for infection - talk with your healthcare provider 3 doses over 6 months; second dose should be given 1 month after the first dose; the third dose should be given at least 2 months after the second dose and at least 4 months after the first dose   Haemophilus influenzae Type B (HIB) Women at increased risk for infection - talk with your healthcare provider 1 to 3 doses   Human papillomavirus (HPV) All women in this age group up to age 26 3 doses; the second dose should be given 1 to 2 months after the first dose and the third dose given 6 months after the first dose   Influenza (flu) All women in this age group Once a year   Measles, mumps, rubella (MMR) All women in this age group who have no record of these infections or vaccines 1 or 2 doses   Meningococcal Women at increased risk for infection - talk with your healthcare provider 1 or more doses   Pneumococcal conjugate vaccine (PCV13) and pneumococcal polysaccharide vaccine (PPSV23) Women at increased risk for infection - talk with your healthcare provider PCV13: 1 dose ages 19 to 65 (protects against 13 types of pneumococcal bacteria)  PPSV23: 1 to 2 doses through age 64, or 1 dose at 65 or older (protects against 23 types of pneumococcal bacteria)      Tetanus/diphtheria/pertussis (Td/Tdap) booster All women in this age group Td every 10 years, or a one-time dose of Tdap instead of a Td booster after age 18, then Td every 10 years   Counseling Who needs it How often   BRCA gene mutation testing for breast and ovarian cancer susceptibility Women with increased risk for having gene mutation When your risk is known   Breast cancer and  chemoprevention Women at high risk for breast cancer When your risk is known   Diet and exercise Women who are overweight or obese When diagnosed, and then at routine exams   Domestic violence Women at the age in which they are able to have children At routine exams   Sexually transmitted infection prevention Women who are sexually active At routine exams   Skin cancer Prevention of skin cancer in fair-skinned adults through age 24 At routine exams   Use of tobacco and the health effects it can cause All women in this age group Every visit   1According to the ACS, women ages 20 to 39 years should have a clinical breast exam (CBE) as part of their routine health exam every 3 years. Breast self-exams are an option for women starting in their 20s. But the  USPSTF does not recommend CBE.  2Those who are 18 years old and not up-to-date on their childhood vaccines should get all appropriate catch-up vaccines recommended by the CDC.  Date Last Reviewed: 8/27/2015  © 5327-4701 [employer]. [address] [place] [address], [place] [address]. All rights reserved. This information is not intended as a substitute for professional medical care. Always follow your healthcare professional's instructions.

## 2018-09-28 LAB — BACTERIA UR CULT: NORMAL

## 2018-12-31 PROBLEM — Z00.00 ROUTINE HEALTH MAINTENANCE: Status: RESOLVED | Noted: 2018-09-26 | Resolved: 2018-12-31

## 2019-01-17 ENCOUNTER — OFFICE VISIT (OUTPATIENT)
Dept: OBSTETRICS AND GYNECOLOGY | Facility: CLINIC | Age: 34
End: 2019-01-17
Payer: COMMERCIAL

## 2019-01-17 ENCOUNTER — APPOINTMENT (OUTPATIENT)
Dept: LAB | Facility: HOSPITAL | Age: 34
End: 2019-01-17
Attending: ADVANCED PRACTICE MIDWIFE
Payer: COMMERCIAL

## 2019-01-17 VITALS
DIASTOLIC BLOOD PRESSURE: 84 MMHG | BODY MASS INDEX: 24.16 KG/M2 | WEIGHT: 155.19 LBS | SYSTOLIC BLOOD PRESSURE: 116 MMHG

## 2019-01-17 DIAGNOSIS — Z00.00 ENCOUNTER FOR SCREENING AND PREVENTATIVE CARE: Primary | ICD-10-CM

## 2019-01-17 DIAGNOSIS — Z30.09 COUNSELING FOR BIRTH CONTROL REGARDING INTRAUTERINE DEVICE (IUD): ICD-10-CM

## 2019-01-17 PROCEDURE — 99395 PREV VISIT EST AGE 18-39: CPT | Mod: S$GLB,,, | Performed by: ADVANCED PRACTICE MIDWIFE

## 2019-01-17 PROCEDURE — 99999 PR PBB SHADOW E&M-EST. PATIENT-LVL III: CPT | Mod: PBBFAC,,, | Performed by: ADVANCED PRACTICE MIDWIFE

## 2019-01-17 PROCEDURE — 88141 LIQUID-BASED PAP SMEAR, SCREENING: ICD-10-PCS | Mod: ,,, | Performed by: PATHOLOGY

## 2019-01-17 PROCEDURE — 88141 CYTOPATH C/V INTERPRET: CPT | Mod: ,,, | Performed by: PATHOLOGY

## 2019-01-17 PROCEDURE — 99999 PR PBB SHADOW E&M-EST. PATIENT-LVL III: ICD-10-PCS | Mod: PBBFAC,,, | Performed by: ADVANCED PRACTICE MIDWIFE

## 2019-01-17 PROCEDURE — 99395 PR PREVENTIVE VISIT,EST,18-39: ICD-10-PCS | Mod: S$GLB,,, | Performed by: ADVANCED PRACTICE MIDWIFE

## 2019-01-17 PROCEDURE — 88175 CYTOPATH C/V AUTO FLUID REDO: CPT | Performed by: PATHOLOGY

## 2019-01-17 PROCEDURE — 87624 HPV HI-RISK TYP POOLED RSLT: CPT

## 2019-01-17 RX ORDER — NORGESTIMATE AND ETHINYL ESTRADIOL 0.25-0.035
1 KIT ORAL DAILY
Qty: 30 TABLET | Refills: 2 | Status: SHIPPED | OUTPATIENT
Start: 2019-01-17 | End: 2020-06-23 | Stop reason: SDUPTHER

## 2019-01-17 NOTE — PROGRESS NOTES
Subjective:      Magalis Russell is a 33 y.o. female who presents for an annual exam. The patient has no complaints today. The patient is sexually active. GYN screening history: last pap: was abnormal: ascus, not exclude hsil . The patient participates in regular exercise: not asked. Has the patient ever been transfused or tattooed?: yes. The patient reports that there is not domestic violence in her life. Pt with questions about Mirena. Education done Pt reports her sisiter with a hx of breast ca. The pt reports that she has had the genetic breast testing done and it was negative.     Menstrual History:  OB History      Para Term  AB Living    2 2 2     2    SAB TAB Ectopic Multiple Live Births            2              LMP 18, NORMAL  The following portions of the patient's history were reviewed and updated as appropriate: allergies, current medications, past family history, past medical history, past social history, past surgical history and problem list.    Review of Systems  A comprehensive review of systems was negative.      Objective:        /84   Wt 70.4 kg (155 lb 3.3 oz)   BMI 24.16 kg/m²     General Appearance:    Alert, cooperative, no distress, appears stated age   Head:    Normocephalic, without obvious abnormality, atraumatic   Eyes:    PERRL, conjunctiva/corneas clear, EOM's intact,      both eyes           Throat:   Lips, mucosa, and tongue normal; teeth and gums normal   Neck:   Supple, symmetrical, trachea midline, no adenopathy;     thyroid:  no enlargement/tenderness/nodules;      Back:     Symmetric, no curvature, ROM normal, no CVA tenderness   Lungs:     Clear to auscultation bilaterally, respirations unlabored   Chest Wall:    No tenderness or deformity    Heart:    Regular rate and rhythm, l, no murmur, rub   or gallop   Breast Exam:    declined   Abdomen:     Soft, non-tender, bowel sounds active all four quadrants,     no masses, no organomegaly   Genitalia:     Normal female without lesion, discharge or tenderness   Rectal:    Normal tone, no masses or tenderness;    guaiac negative stool   Extremities:   Extremities normal, atraumatic, no cyanosis or edema       Skin:   Skin color, texture, turgor normal, no rashes or lesions, tattoos present            .      Assessment:      Healthy female exam.      Plan:       All questions answered.  Await pap smear results.  Breast self exam technique reviewed and patient encouraged to perform self-exam monthly.  Contraception: OCP (estrogen/progesterone).  Pap smear.  Thin prep Pap smear.  with HPV reflex   Mirena ordered  After education done

## 2019-01-22 LAB
HPV HR 12 DNA CVX QL NAA+PROBE: POSITIVE
HPV16 AG SPEC QL: NEGATIVE
HPV18 DNA SPEC QL NAA+PROBE: NEGATIVE

## 2019-01-23 ENCOUNTER — PATIENT MESSAGE (OUTPATIENT)
Dept: OBSTETRICS AND GYNECOLOGY | Facility: CLINIC | Age: 34
End: 2019-01-23

## 2019-01-25 ENCOUNTER — PATIENT MESSAGE (OUTPATIENT)
Dept: OBSTETRICS AND GYNECOLOGY | Facility: CLINIC | Age: 34
End: 2019-01-25

## 2019-01-28 ENCOUNTER — TELEPHONE (OUTPATIENT)
Dept: OBSTETRICS AND GYNECOLOGY | Facility: CLINIC | Age: 34
End: 2019-01-28

## 2019-01-28 ENCOUNTER — PATIENT MESSAGE (OUTPATIENT)
Dept: OBSTETRICS AND GYNECOLOGY | Facility: CLINIC | Age: 34
End: 2019-01-28

## 2019-01-28 NOTE — TELEPHONE ENCOUNTER
----- Message from Jody Marques CNM sent at 1/23/2019  7:01 AM CST -----  OK... So this pt I get back her HPV high risk testing prior to getting the PAP results? Can we call to see when they will be available since it looks like she will need a Colpo. Thanks

## 2019-01-31 ENCOUNTER — PATIENT MESSAGE (OUTPATIENT)
Dept: OBSTETRICS AND GYNECOLOGY | Facility: CLINIC | Age: 34
End: 2019-01-31

## 2019-01-31 ENCOUNTER — PATIENT MESSAGE (OUTPATIENT)
Dept: INTERNAL MEDICINE | Facility: CLINIC | Age: 34
End: 2019-01-31

## 2019-02-04 ENCOUNTER — PROCEDURE VISIT (OUTPATIENT)
Dept: OBSTETRICS AND GYNECOLOGY | Facility: CLINIC | Age: 34
End: 2019-02-04
Payer: COMMERCIAL

## 2019-02-04 VITALS
DIASTOLIC BLOOD PRESSURE: 80 MMHG | HEIGHT: 67 IN | SYSTOLIC BLOOD PRESSURE: 160 MMHG | WEIGHT: 154.75 LBS | BODY MASS INDEX: 24.29 KG/M2

## 2019-02-04 DIAGNOSIS — R87.611 ATYPICAL SQUAMOUS CELLS CANNOT EXCLUDE HIGH GRADE SQUAMOUS INTRAEPITHELIAL LESION ON CYTOLOGIC SMEAR OF CERVIX (ASC-H): Primary | ICD-10-CM

## 2019-02-04 PROCEDURE — 57454 BX/CURETT OF CERVIX W/SCOPE: CPT | Mod: S$GLB,,, | Performed by: OBSTETRICS & GYNECOLOGY

## 2019-02-04 PROCEDURE — 88305 TISSUE EXAM BY PATHOLOGIST: CPT | Performed by: PATHOLOGY

## 2019-02-04 PROCEDURE — 88305 TISSUE SPECIMEN TO PATHOLOGY, OBSTETRICS/GYNECOLOGY: ICD-10-PCS | Mod: 26,,, | Performed by: PATHOLOGY

## 2019-02-04 PROCEDURE — 88305 TISSUE EXAM BY PATHOLOGIST: CPT | Mod: 26,,, | Performed by: PATHOLOGY

## 2019-02-04 PROCEDURE — 57454 COLPOSCOPY-TODAY: ICD-10-PCS | Mod: S$GLB,,, | Performed by: OBSTETRICS & GYNECOLOGY

## 2019-02-04 NOTE — PROGRESS NOTES
Presents for colposcopy, but also wanted to get checked for a possible yeast infection.  Patient had some irritation and a white discharge.  Took OTC antifungal, and thinks her symptoms have cleared up.  Vagina is normal on exam.  Wet prep is unremarkable.

## 2019-02-04 NOTE — PROCEDURES
Colposcopy-Today  Date/Time: 2/4/2019 9:19 AM  Performed by: Natali Lawton MD  Authorized by: Natali Lawton MD     Consent Done?:  Yes (Written)  Assistants?: Yes    List of assistants:  Molly Ch LPN    Colposcopy Site:  Cervix  Position:  Supine  Acrowhite Lesion? Yes (cervical metaplasia noted, but acetowhite changes more prominent from 2-4 o'clock and at 6 o'clock)    Atypical Vessels? Yes (from 2 to 6 o'clock)    Transformation Zone Adequate?: Yes    Biopsy?: Yes         Location:  Cervix ((2 00 and 6 00))  ECC Performed?: Yes    LEEP Performed?: No    Estimated blood loss (cc):  2   Patient tolerated the procedure well with no immediate complications.   Post-operative instructions were provided for the patient.   Patient was discharged and will follow up if any complications occur

## 2019-02-11 ENCOUNTER — PATIENT MESSAGE (OUTPATIENT)
Dept: OBSTETRICS AND GYNECOLOGY | Facility: CLINIC | Age: 34
End: 2019-02-11

## 2019-02-14 ENCOUNTER — PATIENT MESSAGE (OUTPATIENT)
Dept: OBSTETRICS AND GYNECOLOGY | Facility: CLINIC | Age: 34
End: 2019-02-14

## 2019-02-18 ENCOUNTER — TELEPHONE (OUTPATIENT)
Dept: PHARMACY | Facility: CLINIC | Age: 34
End: 2019-02-18

## 2019-02-18 NOTE — TELEPHONE ENCOUNTER
Pt reached regarding  RIGOBERTO. Explained that she has a $3.00 copay for IUD through her pharmacy benefit . She declined pharmacist consultation. Pt does not yet  have an  appointment for insertion at this time. Will contact office to confirm delivery with [facility] staff.

## 2019-03-01 ENCOUNTER — PROCEDURE VISIT (OUTPATIENT)
Dept: OBSTETRICS AND GYNECOLOGY | Facility: CLINIC | Age: 34
End: 2019-03-01
Payer: COMMERCIAL

## 2019-03-01 VITALS
HEIGHT: 67 IN | BODY MASS INDEX: 24.26 KG/M2 | SYSTOLIC BLOOD PRESSURE: 130 MMHG | WEIGHT: 154.56 LBS | DIASTOLIC BLOOD PRESSURE: 86 MMHG

## 2019-03-01 DIAGNOSIS — D06.9 SEVERE DYSPLASIA OF CERVIX (CIN III): Primary | ICD-10-CM

## 2019-03-01 PROCEDURE — 88307 TISSUE EXAM BY PATHOLOGIST: CPT | Performed by: PATHOLOGY

## 2019-03-01 PROCEDURE — 57522 LEEP-GYN: ICD-10-PCS | Mod: S$GLB,,, | Performed by: OBSTETRICS & GYNECOLOGY

## 2019-03-01 PROCEDURE — 57522 CONIZATION OF CERVIX: CPT | Mod: S$GLB,,, | Performed by: OBSTETRICS & GYNECOLOGY

## 2019-03-01 PROCEDURE — 88307 TISSUE SPECIMEN TO PATHOLOGY, OBSTETRICS/GYNECOLOGY: ICD-10-PCS | Mod: 26,,, | Performed by: PATHOLOGY

## 2019-03-01 PROCEDURE — 88307 TISSUE EXAM BY PATHOLOGIST: CPT | Mod: 26,,, | Performed by: PATHOLOGY

## 2019-03-01 NOTE — PROCEDURES
Leep-gyn  Date/Time: 3/1/2019 10:32 AM  Performed by: Natali Lawton MD  Authorized by: Natali Lawton MD   Preparation: Patient was prepped and draped in the usual sterile fashion.  Local anesthesia used: yes  Anesthesia: local infiltration    Anesthesia:  Local anesthesia used: yes  Local Anesthetic: lidocaine 1% with epinephrine  Anesthetic total: 10 mL    Sedation:  Patient sedated: no    Patient tolerance: Patient tolerated the procedure well with no immediate complications  Comments: CC: Presents for LEEP procedure    Magalis Russell is a 33 y.o. female  presents for a LEEP procedure.      DATA REVIEWED:  Last Pap Result: ASC-H  Last Colposcopy Result: SINDY 3, negative ECC     PRE- LEEP PROCEDURE COUNSELING:  Risks of infection, bleeding, pain, injury to adjacent organs as well as future cervical incompetence.  That this procedure does not guarantee to completely remove all abnormal cells and that dysplasia may reoccur in the future.  Alternatives to the LEEP procedure were discussed and the patient agrees to proceed..    PROCEDURE:  TIME OUT PERFORMED.  The cervix and transformation zone were visualized with a speculum and a local block was obtained with 10cc 1% lidocaine with epinephrine.  A large loop was selected.  The entire transformation zone was excised.  The base was cauterized with a ball cautery.  Monsels solution was applied to the base to obtain hemostasis and the speculum removed.  The specimen was placed in formalyn and sent to Pathology for a Histopathologic diagnosis.    ASSESSMENT:  1.Cervical dysplasia.    POST LEEP PROCEDURE COUNSELING:  Manage post LEEP cramping with NSAIDs or Tylenol or Rx per [facility].  Avoid anything in vagina (intercourse, douching, tampons) two weeks after the Procedure.  Expect a watery grey to yellow vaginal discharge for several weeks.  Limit activity for 2 weeks.  Report bleeding heavier than a period, worsening pain, fever > 101.0 F, or foul-smelling vaginal  discharge.  Importance of follow-up stressed.    Counseling lasted approximately 15 minutes and all her questions were answered.    FOLLOW-UP in 6 weeks.

## 2019-03-07 ENCOUNTER — PATIENT MESSAGE (OUTPATIENT)
Dept: OBSTETRICS AND GYNECOLOGY | Facility: CLINIC | Age: 34
End: 2019-03-07

## 2019-03-11 ENCOUNTER — PATIENT MESSAGE (OUTPATIENT)
Dept: OBSTETRICS AND GYNECOLOGY | Facility: CLINIC | Age: 34
End: 2019-03-11

## 2019-03-14 ENCOUNTER — PATIENT MESSAGE (OUTPATIENT)
Dept: OBSTETRICS AND GYNECOLOGY | Facility: CLINIC | Age: 34
End: 2019-03-14

## 2019-03-18 ENCOUNTER — PATIENT MESSAGE (OUTPATIENT)
Dept: OBSTETRICS AND GYNECOLOGY | Facility: CLINIC | Age: 34
End: 2019-03-18

## 2019-03-18 DIAGNOSIS — B37.31 VULVOVAGINAL CANDIDIASIS: Primary | ICD-10-CM

## 2019-03-18 RX ORDER — FLUCONAZOLE 150 MG/1
150 TABLET ORAL ONCE
Qty: 1 TABLET | Refills: 0 | Status: SHIPPED | OUTPATIENT
Start: 2019-03-18 | End: 2019-03-18

## 2019-03-18 NOTE — TELEPHONE ENCOUNTER
Pt has a yeast infection. She would like a pill called in for her since she cannot insert anything into her vagina since she had a leep on 3/1/19.  Please advise. Thank you!

## 2019-03-27 ENCOUNTER — PATIENT MESSAGE (OUTPATIENT)
Dept: OBSTETRICS AND GYNECOLOGY | Facility: CLINIC | Age: 34
End: 2019-03-27

## 2019-04-03 ENCOUNTER — PATIENT MESSAGE (OUTPATIENT)
Dept: OBSTETRICS AND GYNECOLOGY | Facility: CLINIC | Age: 34
End: 2019-04-03

## 2019-04-03 DIAGNOSIS — B37.31 CANDIDIASIS OF VULVA AND VAGINA: ICD-10-CM

## 2019-04-03 RX ORDER — FLUCONAZOLE 150 MG/1
150 TABLET ORAL
Qty: 2 TABLET | Refills: 0 | Status: SHIPPED | OUTPATIENT
Start: 2019-04-03 | End: 2019-04-07

## 2019-04-09 ENCOUNTER — PROCEDURE VISIT (OUTPATIENT)
Dept: OBSTETRICS AND GYNECOLOGY | Facility: CLINIC | Age: 34
End: 2019-04-09
Payer: COMMERCIAL

## 2019-04-09 DIAGNOSIS — D06.9 SEVERE DYSPLASIA OF CERVIX (CIN III): Primary | ICD-10-CM

## 2019-04-09 PROCEDURE — 99024 PR POST-OP FOLLOW-UP VISIT: ICD-10-PCS | Mod: S$GLB,,, | Performed by: OBSTETRICS & GYNECOLOGY

## 2019-04-09 PROCEDURE — 99024 POSTOP FOLLOW-UP VISIT: CPT | Mod: S$GLB,,, | Performed by: OBSTETRICS & GYNECOLOGY

## 2019-04-09 NOTE — PROGRESS NOTES
"  Subjective:       Patient ID: Magalis Russell is a 33 y.o. female.    Chief Complaint:  No chief complaint on file.      History of Present Illness  HPI  Presents for f/u visit s/p LEEP 3/1/2019.  Had a yeast infection, which has been treated and symptoms resolved.  Denies any bleeding or pelvic pain.  Was supposed to get her Mirena inserted today, but wants to reschedule it b/c she is going on a field trip to the [place] [facility] with her class today.    LEEP pathology:   1. CERVIX, ANTERIOR, EXCISION:   Low-grade squamous intraepithelial lesion/cervical intraepithelial neoplasia 1 (SINDY 1).   Margins are negative for dysplasia.   2. CERVIX, POSTERIOR, EXCISION:   High-grade squamous intraepithelial lesion/cervical intraepithelial neoplasia 2-3 (SINDY 2-3).   Associated low-grade squamous intraepithelial lesion/cervical intraepithelial neoplasia 1 (SINDY 1).   Ectocervical margin is positive for low and high-grade dysplasia.   Endocervical margin is positive for low-grade dysplasia."      GYN & OB History  No LMP recorded.   Date of Last Pap: 2019    OB History    Para Term  AB Living   2 2 2     2   SAB TAB Ectopic Multiple Live Births           2      # Outcome Date GA Lbr Galindo/2nd Weight Sex Delivery Anes PTL Lv   2 Term 08 40w0d   F Vag-Spont   CHAVA   1 Term 02 40w0d   M Vag-Spont   CHAVA       Review of Systems  Review of Systems   Constitutional: Negative for fatigue, fever and unexpected weight change.   Gastrointestinal: Negative for abdominal pain, constipation, diarrhea, nausea and vomiting.   Genitourinary: Negative for menstrual problem, pelvic pain, vaginal bleeding, vaginal discharge, vaginal pain and vaginal odor.           Objective:    Physical Exam:   Constitutional: She is oriented to person, place, and time. She appears well-developed and well-nourished. No distress.             Abdominal: Soft. She exhibits no distension and no mass. There is no tenderness. There is no " rebound and no guarding. Hernia confirmed negative in the right inguinal area and confirmed negative in the left inguinal area.     Genitourinary: Vagina normal. Pelvic exam was performed with patient supine. There is no rash, tenderness, lesion or injury on the right labia. There is no rash, tenderness, lesion or injury on the left labia. Uterus is not deviated, not enlarged, not fixed, not tender and not experiencing uterine prolapse. Cervix is normal. Right adnexum displays no mass, no tenderness and no fullness. Left adnexum displays no mass, no tenderness and no fullness. No erythema, tenderness, bleeding, rectocele, cystocele or unspecified prolapse of vaginal walls in the vagina. No foreign body in the vagina. No signs of injury around the vagina. No vaginal discharge found. Cervix exhibits no motion tenderness, no discharge (cervix appears well-healed) and no friability.        Uterus Size: 6 cm       Neurological: She is alert and oriented to person, place, and time.     Psychiatric: She has a normal mood and affect.          Assessment:        1. Severe dysplasia of cervix (SINDY III)                Plan:      Diagnoses and all orders for this visit:    Severe dysplasia of cervix (SINDY III)    Reviewed pathology with the patient.  Answered some questions she had about HPV and cervical dysplasia.  Given positive margins, I recommend repeat pap with ECC in 6 months.  RTC for Mirena IUD insertion.

## 2019-04-15 ENCOUNTER — TELEPHONE (OUTPATIENT)
Dept: OBSTETRICS AND GYNECOLOGY | Facility: CLINIC | Age: 34
End: 2019-04-15

## 2019-05-16 ENCOUNTER — HOSPITAL ENCOUNTER (OUTPATIENT)
Dept: RADIOLOGY | Facility: HOSPITAL | Age: 34
Discharge: HOME | End: 2019-05-16
Attending: NURSE PRACTITIONER
Payer: COMMERCIAL

## 2019-05-16 DIAGNOSIS — M25.569 PAIN IN JOINT, LOWER LEG: ICD-10-CM

## 2019-05-16 DIAGNOSIS — M25.569 PAIN IN JOINT, LOWER LEG: Primary | ICD-10-CM

## 2019-05-16 PROCEDURE — 73562 XR KNEE ORTHO BILAT: ICD-10-PCS | Mod: 26,,, | Performed by: RADIOLOGY

## 2019-05-16 PROCEDURE — 73562 X-RAY EXAM OF KNEE 3: CPT | Mod: TC,50,PO

## 2019-05-16 PROCEDURE — 73562 X-RAY EXAM OF KNEE 3: CPT | Mod: 26,,, | Performed by: RADIOLOGY

## 2019-09-10 ENCOUNTER — APPOINTMENT (OUTPATIENT)
Dept: LAB | Facility: HOSPITAL | Age: 34
End: 2019-09-10
Attending: OBSTETRICS & GYNECOLOGY
Payer: COMMERCIAL

## 2019-09-10 ENCOUNTER — OFFICE VISIT (OUTPATIENT)
Dept: OBSTETRICS AND GYNECOLOGY | Facility: CLINIC | Age: 34
End: 2019-09-10
Payer: COMMERCIAL

## 2019-09-10 VITALS
SYSTOLIC BLOOD PRESSURE: 130 MMHG | DIASTOLIC BLOOD PRESSURE: 90 MMHG | WEIGHT: 163.56 LBS | BODY MASS INDEX: 25.62 KG/M2

## 2019-09-10 DIAGNOSIS — D06.9 SEVERE DYSPLASIA OF CERVIX (CIN III): Primary | ICD-10-CM

## 2019-09-10 PROCEDURE — 99999 PR PBB SHADOW E&M-EST. PATIENT-LVL II: CPT | Mod: PBBFAC,,, | Performed by: OBSTETRICS & GYNECOLOGY

## 2019-09-10 PROCEDURE — 88175 CYTOPATH C/V AUTO FLUID REDO: CPT

## 2019-09-10 PROCEDURE — 88305 TISSUE SPECIMEN TO PATHOLOGY, OBSTETRICS/GYNECOLOGY: ICD-10-PCS | Mod: 26,,, | Performed by: PATHOLOGY

## 2019-09-10 PROCEDURE — 88305 TISSUE EXAM BY PATHOLOGIST: CPT | Performed by: PATHOLOGY

## 2019-09-10 PROCEDURE — 3008F PR BODY MASS INDEX (BMI) DOCUMENTED: ICD-10-PCS | Mod: CPTII,S$GLB,, | Performed by: OBSTETRICS & GYNECOLOGY

## 2019-09-10 PROCEDURE — 99999 PR PBB SHADOW E&M-EST. PATIENT-LVL II: ICD-10-PCS | Mod: PBBFAC,,, | Performed by: OBSTETRICS & GYNECOLOGY

## 2019-09-10 PROCEDURE — 99213 PR OFFICE/OUTPT VISIT, EST, LEVL III, 20-29 MIN: ICD-10-PCS | Mod: S$GLB,,, | Performed by: OBSTETRICS & GYNECOLOGY

## 2019-09-10 PROCEDURE — 99213 OFFICE O/P EST LOW 20 MIN: CPT | Mod: S$GLB,,, | Performed by: OBSTETRICS & GYNECOLOGY

## 2019-09-10 PROCEDURE — 3008F BODY MASS INDEX DOCD: CPT | Mod: CPTII,S$GLB,, | Performed by: OBSTETRICS & GYNECOLOGY

## 2019-09-10 RX ORDER — FLUTICASONE PROPIONATE 50 MCG
1 SPRAY, SUSPENSION (ML) NASAL DAILY
Refills: 0 | COMMUNITY
Start: 2019-08-02 | End: 2021-06-22

## 2019-09-10 RX ORDER — MONTELUKAST SODIUM 10 MG/1
1 TABLET ORAL DAILY
Refills: 0 | COMMUNITY
Start: 2019-08-02 | End: 2020-01-03

## 2019-09-10 NOTE — PROGRESS NOTES
"  Subjective:       Patient ID: Magalis Russell is a 33 y.o. female.    Chief Complaint:  Pap with ECC    History of Present Illness  HPI  Presents for repeat pap smear with ECC.  Is s/p LEEP 3/1/2019.   Pathology from LEEP:  "1. CERVIX, ANTERIOR, EXCISION:   Low-grade squamous intraepithelial lesion/cervical intraepithelial neoplasia 1 (SINDY 1).   Margins are negative for dysplasia.   2. CERVIX, POSTERIOR, EXCISION:   High-grade squamous intraepithelial lesion/cervical intraepithelial neoplasia 2-3 (SINDY 2-3).   Associated low-grade squamous intraepithelial lesion/cervical intraepithelial neoplasia 1 (SINDY 1).   Ectocervical margin is positive for low and high-grade dysplasia.   Endocervical margin is positive for low-grade dysplasia."       Since margins were positive, plan was for pap with ECC in 6 months.  No complaints.    GYN & OB History  Patient's last menstrual period was 2019.   Date of Last Pap: 2019    OB History    Para Term  AB Living   2 2 2     2   SAB TAB Ectopic Multiple Live Births           2      # Outcome Date GA Lbr Galindo/2nd Weight Sex Delivery Anes PTL Lv   2 Term 08 40w0d   F Vag-Spont   CHAVA   1 Term 02 40w0d   M Vag-Spont   CHAVA       Review of Systems  Review of Systems   Constitutional: Negative for fatigue, fever and unexpected weight change.   Gastrointestinal: Negative for abdominal pain, constipation, diarrhea, nausea and vomiting.   Genitourinary: Negative for dysuria, frequency, urgency, vaginal bleeding, vaginal discharge, vaginal pain, postcoital bleeding and vaginal odor.           Objective:    Physical Exam:   Constitutional: She is oriented to person, place, and time. She appears well-developed and well-nourished. No distress.             Abdominal: Soft. She exhibits no distension and no mass. There is no tenderness. There is no rebound and no guarding. Hernia confirmed negative in the right inguinal area and confirmed negative in the left " inguinal area.     Genitourinary: Vagina normal. Pelvic exam was performed with patient supine. There is no rash, tenderness, lesion or injury on the right labia. There is no rash, tenderness, lesion or injury on the left labia. Uterus is not deviated, not enlarged, not fixed, not tender and not experiencing uterine prolapse. Right adnexum displays no mass, no tenderness and no fullness. Left adnexum displays no mass, no tenderness and no fullness. No erythema, tenderness, bleeding, rectocele, cystocele or unspecified prolapse of vaginal walls in the vagina. No foreign body in the vagina. No signs of injury around the vagina. No vaginal discharge found. Cervix exhibits no motion tenderness, no discharge and no friability.        Uterus Size: 6 cm       Neurological: She is alert and oriented to person, place, and time.     Psychiatric: She has a normal mood and affect.          Assessment:        1. Severe dysplasia of cervix (SINDY III)                Plan:      Magalis was seen today for well woman.    Diagnoses and all orders for this visit:    Severe dysplasia of cervix (SINDY III)  -     Liquid-based pap smear, screening  -     Tissue Specimen To Pathology, Obstetrics/Gynecology    Will determine follow-up based on results.

## 2019-09-11 ENCOUNTER — LAB VISIT (OUTPATIENT)
Dept: LAB | Facility: HOSPITAL | Age: 34
End: 2019-09-11
Attending: NURSE PRACTITIONER
Payer: COMMERCIAL

## 2019-09-11 ENCOUNTER — TELEPHONE (OUTPATIENT)
Dept: INTERNAL MEDICINE | Facility: CLINIC | Age: 34
End: 2019-09-11

## 2019-09-11 ENCOUNTER — OFFICE VISIT (OUTPATIENT)
Dept: INTERNAL MEDICINE | Facility: CLINIC | Age: 34
End: 2019-09-11
Payer: COMMERCIAL

## 2019-09-11 VITALS
DIASTOLIC BLOOD PRESSURE: 80 MMHG | TEMPERATURE: 98 F | WEIGHT: 165.13 LBS | BODY MASS INDEX: 25.92 KG/M2 | HEART RATE: 77 BPM | RESPIRATION RATE: 16 BRPM | HEIGHT: 67 IN | OXYGEN SATURATION: 98 % | SYSTOLIC BLOOD PRESSURE: 130 MMHG

## 2019-09-11 DIAGNOSIS — Z13.220 ENCOUNTER FOR SCREENING FOR LIPID DISORDER: ICD-10-CM

## 2019-09-11 DIAGNOSIS — R00.2 PALPITATIONS: ICD-10-CM

## 2019-09-11 DIAGNOSIS — Z00.00 ROUTINE ADULT HEALTH MAINTENANCE: Primary | ICD-10-CM

## 2019-09-11 DIAGNOSIS — Z91.018 MULTIPLE FOOD ALLERGIES: ICD-10-CM

## 2019-09-11 DIAGNOSIS — Z13.29 SCREENING FOR THYROID DISORDER: ICD-10-CM

## 2019-09-11 DIAGNOSIS — Z00.00 ROUTINE ADULT HEALTH MAINTENANCE: ICD-10-CM

## 2019-09-11 LAB
ALBUMIN SERPL BCP-MCNC: 3.8 G/DL (ref 3.5–5.2)
ALP SERPL-CCNC: 58 U/L (ref 55–135)
ALT SERPL W/O P-5'-P-CCNC: 12 U/L (ref 10–44)
ANION GAP SERPL CALC-SCNC: 9 MMOL/L (ref 8–16)
AST SERPL-CCNC: 13 U/L (ref 10–40)
BASOPHILS # BLD AUTO: 0.03 K/UL (ref 0–0.2)
BASOPHILS NFR BLD: 0.4 % (ref 0–1.9)
BILIRUB SERPL-MCNC: 0.4 MG/DL (ref 0.1–1)
BUN SERPL-MCNC: 15 MG/DL (ref 6–20)
CALCIUM SERPL-MCNC: 9.2 MG/DL (ref 8.7–10.5)
CHLORIDE SERPL-SCNC: 108 MMOL/L (ref 95–110)
CO2 SERPL-SCNC: 24 MMOL/L (ref 23–29)
CREAT SERPL-MCNC: 0.8 MG/DL (ref 0.5–1.4)
DIFFERENTIAL METHOD: ABNORMAL
EOSINOPHIL # BLD AUTO: 0.3 K/UL (ref 0–0.5)
EOSINOPHIL NFR BLD: 3.7 % (ref 0–8)
ERYTHROCYTE [DISTWIDTH] IN BLOOD BY AUTOMATED COUNT: 13.2 % (ref 11.5–14.5)
EST. GFR  (AFRICAN AMERICAN): >60 ML/MIN/1.73 M^2
EST. GFR  (NON AFRICAN AMERICAN): >60 ML/MIN/1.73 M^2
GLUCOSE SERPL-MCNC: 86 MG/DL (ref 70–110)
HCT VFR BLD AUTO: 37.9 % (ref 37–48.5)
HGB BLD-MCNC: 12.2 G/DL (ref 12–16)
LYMPHOCYTES # BLD AUTO: 2.6 K/UL (ref 1–4.8)
LYMPHOCYTES NFR BLD: 31.8 % (ref 18–48)
MCH RBC QN AUTO: 30.7 PG (ref 27–31)
MCHC RBC AUTO-ENTMCNC: 32.2 G/DL (ref 32–36)
MCV RBC AUTO: 96 FL (ref 82–98)
MONOCYTES # BLD AUTO: 0.6 K/UL (ref 0.3–1)
MONOCYTES NFR BLD: 7.1 % (ref 4–15)
NEUTROPHILS # BLD AUTO: 4.7 K/UL (ref 1.8–7.7)
NEUTROPHILS NFR BLD: 57 % (ref 38–73)
PLATELET # BLD AUTO: 233 K/UL (ref 150–350)
PMV BLD AUTO: 10.5 FL (ref 9.2–12.9)
POTASSIUM SERPL-SCNC: 4.1 MMOL/L (ref 3.5–5.1)
PROT SERPL-MCNC: 7.1 G/DL (ref 6–8.4)
RBC # BLD AUTO: 3.97 M/UL (ref 4–5.4)
SODIUM SERPL-SCNC: 141 MMOL/L (ref 136–145)
TSH SERPL DL<=0.005 MIU/L-ACNC: 1.4 UIU/ML (ref 0.4–4)
WBC # BLD AUTO: 8.18 K/UL (ref 3.9–12.7)

## 2019-09-11 PROCEDURE — 99395 PREV VISIT EST AGE 18-39: CPT | Mod: S$GLB,,, | Performed by: NURSE PRACTITIONER

## 2019-09-11 PROCEDURE — 84443 ASSAY THYROID STIM HORMONE: CPT | Mod: PO

## 2019-09-11 PROCEDURE — 80053 COMPREHEN METABOLIC PANEL: CPT | Mod: PO

## 2019-09-11 PROCEDURE — 85025 COMPLETE CBC W/AUTO DIFF WBC: CPT | Mod: PO

## 2019-09-11 PROCEDURE — 36415 COLL VENOUS BLD VENIPUNCTURE: CPT | Mod: PO

## 2019-09-11 PROCEDURE — 99999 PR PBB SHADOW E&M-EST. PATIENT-LVL III: ICD-10-PCS | Mod: PBBFAC,,, | Performed by: NURSE PRACTITIONER

## 2019-09-11 PROCEDURE — 83036 HEMOGLOBIN GLYCOSYLATED A1C: CPT

## 2019-09-11 PROCEDURE — 99999 PR PBB SHADOW E&M-EST. PATIENT-LVL III: CPT | Mod: PBBFAC,,, | Performed by: NURSE PRACTITIONER

## 2019-09-11 PROCEDURE — 80061 LIPID PANEL: CPT

## 2019-09-11 PROCEDURE — 99395 PR PREVENTIVE VISIT,EST,18-39: ICD-10-PCS | Mod: S$GLB,,, | Performed by: NURSE PRACTITIONER

## 2019-09-11 RX ORDER — EPINEPHRINE 0.3 MG/.3ML
1 INJECTION INTRAMUSCULAR ONCE
Qty: 1 EACH | Refills: 0 | Status: SHIPPED | OUTPATIENT
Start: 2019-09-11 | End: 2019-09-12 | Stop reason: SDUPTHER

## 2019-09-11 NOTE — PROGRESS NOTES
Subjective:       Patient ID: Magalis Russell is a 34 y.o. female.    Chief Complaint: Annual Exam    Mrs. Russell is here today for annual wellness exam. No acute complaints today. She is a teacher at [employer]. School year is going well so far. She reports having a generally healthy diet since she has had to adjust her diet to fit her 's since he was diagnosed with DM. Her exercise regimen consist of walking for approx 30 min once or twice a week. Declined flu vaccination, and reports that she believes her tetanus vaccination is UTD, but cannot remember where she received it. Does not want a tetanus today. She does report needing a refill for her epi pens since they are .     She does report having an episode of palpitations this summer with no known cause, and another day while doing house work, but has not had any in over a month. Denies CP, SOB, anxiousness, dizziness, HA, nausea, or syncope.       Patient Active Problem List   Diagnosis    Urinary frequency    Counseling for birth control regarding intrauterine device (IUD)    Atypical squamous cells cannot exclude high grade squamous intraepithelial lesion on cytologic smear of cervix (ASC-H)    Severe dysplasia of cervix (SINDY III)    Candidiasis of vulva and vagina    Palpitations       Family History   Problem Relation Age of Onset    Heart failure Mother     Aneurysm Father     Breast cancer Sister      Past Surgical History:   Procedure Laterality Date    LOOP ELECTROSURGICAL EXCISION PROCEDURE (LEEP)           Current Outpatient Medications:     fluticasone propionate (FLONASE) 50 mcg/actuation nasal spray, 1 spray by Each Nostril route once daily., Disp: , Rfl: 0    montelukast (SINGULAIR) 10 mg tablet, Take 1 tablet by mouth once daily., Disp: , Rfl: 0    norgestimate-ethinyl estradiol (SPRINTEC, 28,) 0.25-35 mg-mcg per tablet, Take 1 tablet by mouth once daily., Disp: 30 tablet, Rfl: 2    EPINEPHrine (EPIPEN) 0.3  "mg/0.3 mL AtIn, Inject 0.3 mLs (0.3 mg total) into the muscle once. for 1 dose, Disp: 2 each, Rfl: 0    Review of Systems   Constitutional: Negative for activity change, appetite change, chills, fatigue, fever and unexpected weight change.   HENT: Positive for congestion, postnasal drip and rhinorrhea. Negative for hearing loss, sinus pressure, sinus pain, sneezing, sore throat and trouble swallowing.    Eyes: Negative for discharge and visual disturbance.   Respiratory: Negative for cough, chest tightness, shortness of breath and wheezing.    Cardiovascular: Positive for palpitations (intermittent, none since this summer). Negative for chest pain.   Gastrointestinal: Negative for blood in stool, constipation, diarrhea and vomiting.   Endocrine: Negative for polydipsia and polyuria.   Genitourinary: Negative for difficulty urinating, dysuria, hematuria and menstrual problem.   Musculoskeletal: Negative for arthralgias, joint swelling and neck pain.   Skin: Negative for color change, pallor and rash.   Neurological: Positive for headaches. Negative for dizziness, weakness, light-headedness and numbness.   Psychiatric/Behavioral: Negative for confusion and dysphoric mood.       Objective:   /80 (BP Location: Right arm, Patient Position: Sitting, BP Method: Medium (Manual))   Pulse 77   Temp 98.4 °F (36.9 °C) (Tympanic)   Resp 16   Ht 5' 7" (1.702 m)   Wt 74.9 kg (165 lb 2 oz)   LMP 08/28/2019   SpO2 98%   BMI 25.86 kg/m²      Physical Exam   Constitutional: She is oriented to person, place, and time. She appears well-developed and well-nourished. She is cooperative. She does not appear ill. No distress.   HENT:   Head: Normocephalic and atraumatic.   Right Ear: External ear and ear canal normal. No tenderness. Tympanic membrane is not injected, not erythematous and not bulging. A middle ear effusion is present.   Left Ear: External ear and ear canal normal. No tenderness. Tympanic membrane is not " injected, not erythematous and not bulging. A middle ear effusion is present.   Nose: Mucosal edema (mild) and rhinorrhea present. No sinus tenderness. Right sinus exhibits no maxillary sinus tenderness and no frontal sinus tenderness. Left sinus exhibits no maxillary sinus tenderness and no frontal sinus tenderness.   Mouth/Throat: Uvula is midline, oropharynx is clear and moist and mucous membranes are normal. No posterior oropharyngeal edema or posterior oropharyngeal erythema.   Eyes: Pupils are equal, round, and reactive to light. Conjunctivae and EOM are normal.   Neck: Normal range of motion. Neck supple.   Cardiovascular: Normal rate, regular rhythm, normal heart sounds and intact distal pulses. Exam reveals no gallop and no friction rub.   No murmur heard.  Pulmonary/Chest: Effort normal and breath sounds normal. No respiratory distress. She has no wheezes. She has no rales.   Abdominal: Soft. Bowel sounds are normal. She exhibits no distension. There is no tenderness. There is no guarding.   Musculoskeletal: Normal range of motion. She exhibits no edema, tenderness or deformity.   Lymphadenopathy:     She has no cervical adenopathy.   Neurological: She is alert and oriented to person, place, and time. No cranial nerve deficit or sensory deficit.   Skin: Skin is warm and dry. Capillary refill takes less than 2 seconds. No rash noted. She is not diaphoretic. No erythema. No pallor.   Psychiatric: She has a normal mood and affect. Her behavior is normal.   Vitals reviewed.      Assessment & Plan     Problem List Items Addressed This Visit        Cardiac/Vascular    Palpitations    Current Assessment & Plan     Will continue to monitor. Discussed when to RTC and when to go to ED. Pt has not had palpitations in over a month and only had two separate episodes of them. Discussed possible monitor or referral to cardiology if they become more frequent/persistent.            Other Visit Diagnoses     Routine adult  health maintenance    -  Primary    Relevant Orders    TSH (Completed)    Hemoglobin A1c (Completed)    CBC auto differential (Completed)    Comprehensive metabolic panel (Completed)    Lipid panel (Completed)    Encounter for screening for lipid disorder        Relevant Orders    Lipid panel (Completed)    Screening for thyroid disorder        Relevant Orders    TSH (Completed)    Multiple food allergies      Rx for epi pen ordered.            Follow up in 1 year (on 9/11/2020) for for annual exam. RTC if palpitations return and become more frequent. .

## 2019-09-11 NOTE — TELEPHONE ENCOUNTER
Patient PA was denied for her EpiPen 2-Billy 0.3/0.3ML. Can this script be changed or sent through [facility]. Patient seen in office on today.       Please Advise

## 2019-09-12 LAB
CHOLEST SERPL-MCNC: 126 MG/DL (ref 120–199)
CHOLEST/HDLC SERPL: 2.6 {RATIO} (ref 2–5)
ESTIMATED AVG GLUCOSE: 108 MG/DL (ref 68–131)
HBA1C MFR BLD HPLC: 5.4 % (ref 4–5.6)
HDLC SERPL-MCNC: 48 MG/DL (ref 40–75)
HDLC SERPL: 38.1 % (ref 20–50)
LDLC SERPL CALC-MCNC: 61 MG/DL (ref 63–159)
NONHDLC SERPL-MCNC: 78 MG/DL
TRIGL SERPL-MCNC: 85 MG/DL (ref 30–150)

## 2019-09-12 RX ORDER — EPINEPHRINE 0.3 MG/.3ML
1 INJECTION SUBCUTANEOUS ONCE
Qty: 2 EACH | Refills: 0 | Status: SHIPPED | OUTPATIENT
Start: 2019-09-12 | End: 2021-12-10 | Stop reason: SDUPTHER

## 2019-09-13 ENCOUNTER — TELEPHONE (OUTPATIENT)
Dept: INTERNAL MEDICINE | Facility: CLINIC | Age: 34
End: 2019-09-13

## 2019-09-13 PROBLEM — R00.2 PALPITATIONS: Status: ACTIVE | Noted: 2019-09-13

## 2019-09-13 NOTE — TELEPHONE ENCOUNTER
----- Message from Connie Graves sent at 9/13/2019 12:03 PM CDT -----  Contact: ms cindy graham  needs documentation needing why patient can't use generic pen..[phone]

## 2019-09-13 NOTE — TELEPHONE ENCOUNTER
Ms. Choudhary with [facility] wants to know why patient can't use generic pen? LOV: 09/11/19.      Please Advise

## 2019-09-13 NOTE — ASSESSMENT & PLAN NOTE
Will continue to monitor. Discussed when to RTC and when to go to ED. Pt has not had palpitations in over a month and only had two separate episodes of them. Discussed possible monitor or referral to cardiology if they become more frequent/persistent.

## 2019-09-13 NOTE — TELEPHONE ENCOUNTER
I spoke with pharmacy. She can change to generic pen if needed. They will make the change, and update patient.

## 2019-09-20 ENCOUNTER — TELEPHONE (OUTPATIENT)
Dept: INTERNAL MEDICINE | Facility: CLINIC | Age: 34
End: 2019-09-20

## 2019-09-20 NOTE — TELEPHONE ENCOUNTER
[facility] called over to advise that the patient insurance doesn't cover her Epi-Pen. She wants to know if a generic can be called in for the patient? She states that the patient is currently over of her Epi-Pin. LOV: 09/11/19.      Please Advise

## 2019-09-23 NOTE — TELEPHONE ENCOUNTER
I spoke with [facility] on 9/13/2019 about this issue and they changed it to the generic brand. They said they were going to notify patient. Do I need to call the generic to [facility]'s or is [facility] going to send it to patient?

## 2019-09-23 NOTE — TELEPHONE ENCOUNTER
Spoke with [facility]. Epi-pen order changed to generic. They stated that they will prepare rx and call her when it is ready. Please notify pt.

## 2019-09-23 NOTE — TELEPHONE ENCOUNTER
Spoke with patient, stated she has not hear anything, but she spoke with her pharmacy and was told need to get the okay to change to a generic brand.     Can you send in a generic brand of the epic pen to [facility]?

## 2019-09-30 ENCOUNTER — OFFICE VISIT (OUTPATIENT)
Dept: INTERNAL MEDICINE | Facility: CLINIC | Age: 34
End: 2019-09-30
Payer: COMMERCIAL

## 2019-09-30 VITALS
BODY MASS INDEX: 25.64 KG/M2 | WEIGHT: 163.38 LBS | DIASTOLIC BLOOD PRESSURE: 80 MMHG | OXYGEN SATURATION: 99 % | HEIGHT: 67 IN | HEART RATE: 70 BPM | TEMPERATURE: 97 F | SYSTOLIC BLOOD PRESSURE: 136 MMHG

## 2019-09-30 DIAGNOSIS — M79.605 LOWER EXTREMITY PAIN, ANTERIOR, LEFT: Primary | ICD-10-CM

## 2019-09-30 PROCEDURE — 99999 PR PBB SHADOW E&M-EST. PATIENT-LVL III: CPT | Mod: PBBFAC,,, | Performed by: FAMILY MEDICINE

## 2019-09-30 PROCEDURE — 90686 FLU VACCINE (QUAD) GREATER THAN OR EQUAL TO 3YO PRESERVATIVE FREE IM: ICD-10-PCS | Mod: S$GLB,,, | Performed by: FAMILY MEDICINE

## 2019-09-30 PROCEDURE — 3008F PR BODY MASS INDEX (BMI) DOCUMENTED: ICD-10-PCS | Mod: CPTII,S$GLB,, | Performed by: FAMILY MEDICINE

## 2019-09-30 PROCEDURE — 3008F BODY MASS INDEX DOCD: CPT | Mod: CPTII,S$GLB,, | Performed by: FAMILY MEDICINE

## 2019-09-30 PROCEDURE — 99214 OFFICE O/P EST MOD 30 MIN: CPT | Mod: 25,S$GLB,, | Performed by: FAMILY MEDICINE

## 2019-09-30 PROCEDURE — 90471 IMMUNIZATION ADMIN: CPT | Mod: S$GLB,,, | Performed by: FAMILY MEDICINE

## 2019-09-30 PROCEDURE — 99999 PR PBB SHADOW E&M-EST. PATIENT-LVL III: ICD-10-PCS | Mod: PBBFAC,,, | Performed by: FAMILY MEDICINE

## 2019-09-30 PROCEDURE — 90686 IIV4 VACC NO PRSV 0.5 ML IM: CPT | Mod: S$GLB,,, | Performed by: FAMILY MEDICINE

## 2019-09-30 PROCEDURE — 99214 PR OFFICE/OUTPT VISIT, EST, LEVL IV, 30-39 MIN: ICD-10-PCS | Mod: 25,S$GLB,, | Performed by: FAMILY MEDICINE

## 2019-09-30 PROCEDURE — 90471 FLU VACCINE (QUAD) GREATER THAN OR EQUAL TO 3YO PRESERVATIVE FREE IM: ICD-10-PCS | Mod: S$GLB,,, | Performed by: FAMILY MEDICINE

## 2019-09-30 NOTE — PROGRESS NOTES
" Patient ID: Magalis Russell is a 34 y.o. female.    Chief Complaint: left leg pain (since last wednesday) and Flu Vaccine    HPI Pt is 33 yo F with 5 day hx of left leg pain. Says she fell on her leg 3 days ago but cannot recall which leg she fell on. Pain begins at anterior thigh and has extended to knee today. Pain is throbbing, constant and relieved temporarily with Tylenol. Has also used epsom salt. Worse with walking down stairs. Feels like strained muscle. She denies recent hospitalization, surgery, immobility. Does take prescribed OCP. Denies swelling and redness. No nausea, vomiting, changes in urine color, abdominal pain.    Family History   Problem Relation Age of Onset    Heart failure Mother     Aneurysm Father     Breast cancer Sister        Current Outpatient Medications:     fluticasone propionate (FLONASE) 50 mcg/actuation nasal spray, 1 spray by Each Nostril route once daily., Disp: , Rfl: 0    montelukast (SINGULAIR) 10 mg tablet, Take 1 tablet by mouth once daily., Disp: , Rfl: 0    norgestimate-ethinyl estradiol (SPRINTEC, 28,) 0.25-35 mg-mcg per tablet, Take 1 tablet by mouth once daily., Disp: 30 tablet, Rfl: 2    EPINEPHrine (EPIPEN) 0.3 mg/0.3 mL AtIn, Inject 0.3 mLs (0.3 mg total) into the muscle once. for 1 dose, Disp: 2 each, Rfl: 0    Review of Systems   Constitutional: Negative for chills and fever.   Respiratory: Negative for cough, choking, chest tightness and shortness of breath.    Cardiovascular: Negative for chest pain, palpitations and leg swelling.   Musculoskeletal: Negative for joint swelling.        Leg pain   Skin: Negative for color change, pallor, rash and wound.   Neurological: Negative for weakness.       Objective:   /80 (BP Location: Left arm, Patient Position: Sitting, BP Method: Large (Automatic))   Pulse 70   Temp 97.2 °F (36.2 °C) (Tympanic)   Ht 5' 7" (1.702 m)   Wt 74.1 kg (163 lb 5.8 oz)   LMP 09/25/2019   SpO2 99%   BMI 25.59 kg/m²    "   Physical Exam   Constitutional: She is oriented to person, place, and time. She appears well-developed and well-nourished. No distress.   Cardiovascular: Normal rate, regular rhythm and normal heart sounds.   No murmur heard.  Pulmonary/Chest: Effort normal and breath sounds normal. No respiratory distress. She has no wheezes. She has no rales.   Musculoskeletal: Normal range of motion. She exhibits tenderness (anterior thigh, popliteal fossa). She exhibits no edema or deformity.   Negative homans sign, symmetrical calf circumference   Neurological: She is alert and oriented to person, place, and time. No sensory deficit. She exhibits normal muscle tone. Coordination normal.   Skin: Skin is warm. No rash noted. She is not diaphoretic. No erythema. No pallor.       Assessment & Plan     Problem List Items Addressed This Visit        Orthopedic    Lower extremity pain, anterior, left - Primary    Current Assessment & Plan     -Ordered venous US to rule out DVT due to OCP use, duration of pain.          Relevant Orders    US Lower Extremity Veins Left            Follow up if symptoms worsen or fail to improve.

## 2019-10-01 ENCOUNTER — HOSPITAL ENCOUNTER (OUTPATIENT)
Dept: RADIOLOGY | Facility: HOSPITAL | Age: 34
Discharge: HOME | End: 2019-10-01
Attending: FAMILY MEDICINE
Payer: COMMERCIAL

## 2019-10-01 DIAGNOSIS — M79.605 LOWER EXTREMITY PAIN, ANTERIOR, LEFT: ICD-10-CM

## 2019-10-01 PROCEDURE — 93971 US LOWER EXTREMITY VEINS LEFT: ICD-10-PCS | Mod: 26,LT,, | Performed by: RADIOLOGY

## 2019-10-01 PROCEDURE — 93971 EXTREMITY STUDY: CPT | Mod: 26,LT,, | Performed by: RADIOLOGY

## 2019-10-01 PROCEDURE — 93971 EXTREMITY STUDY: CPT | Mod: TC,PO,LT

## 2019-10-01 NOTE — PROGRESS NOTES
I called and discussed US Venous Left lower extremity with patient which was negative for thrombosis. She stated that today her pain improved. She can take scheduled NSAIDs for the next 3-4 days and tylenol prn. If pain persists or worsens asked her to return to clinic

## 2019-10-08 ENCOUNTER — HOSPITAL ENCOUNTER (EMERGENCY)
Facility: HOSPITAL | Age: 34
Discharge: HOME | End: 2019-10-08
Attending: EMERGENCY MEDICINE
Payer: COMMERCIAL

## 2019-10-08 VITALS
SYSTOLIC BLOOD PRESSURE: 141 MMHG | BODY MASS INDEX: 25.26 KG/M2 | HEART RATE: 75 BPM | RESPIRATION RATE: 16 BRPM | DIASTOLIC BLOOD PRESSURE: 82 MMHG | TEMPERATURE: 98 F | WEIGHT: 160.94 LBS | OXYGEN SATURATION: 100 % | HEIGHT: 67 IN

## 2019-10-08 DIAGNOSIS — R42 LIGHTHEADEDNESS: Primary | ICD-10-CM

## 2019-10-08 DIAGNOSIS — I10 ESSENTIAL HYPERTENSION: ICD-10-CM

## 2019-10-08 DIAGNOSIS — R00.2 PALPITATIONS: ICD-10-CM

## 2019-10-08 LAB
ALBUMIN SERPL BCP-MCNC: 3.7 G/DL (ref 3.5–5.2)
ALP SERPL-CCNC: 54 U/L (ref 55–135)
ALT SERPL W/O P-5'-P-CCNC: 17 U/L (ref 10–44)
ANION GAP SERPL CALC-SCNC: 7 MMOL/L (ref 8–16)
AST SERPL-CCNC: 16 U/L (ref 10–40)
B-HCG UR QL: NEGATIVE
BASOPHILS # BLD AUTO: 0.05 K/UL (ref 0–0.2)
BASOPHILS NFR BLD: 0.8 % (ref 0–1.9)
BILIRUB SERPL-MCNC: 0.6 MG/DL (ref 0.1–1)
BILIRUB UR QL STRIP: NEGATIVE
BUN SERPL-MCNC: 17 MG/DL (ref 6–20)
CALCIUM SERPL-MCNC: 8.7 MG/DL (ref 8.7–10.5)
CHLORIDE SERPL-SCNC: 110 MMOL/L (ref 95–110)
CLARITY UR REFRACT.AUTO: CLEAR
CO2 SERPL-SCNC: 23 MMOL/L (ref 23–29)
COLOR UR AUTO: YELLOW
CREAT SERPL-MCNC: 0.9 MG/DL (ref 0.5–1.4)
DIFFERENTIAL METHOD: ABNORMAL
EOSINOPHIL # BLD AUTO: 0.5 K/UL (ref 0–0.5)
EOSINOPHIL NFR BLD: 8.1 % (ref 0–8)
ERYTHROCYTE [DISTWIDTH] IN BLOOD BY AUTOMATED COUNT: 13.2 % (ref 11.5–14.5)
EST. GFR  (AFRICAN AMERICAN): >60 ML/MIN/1.73 M^2
EST. GFR  (NON AFRICAN AMERICAN): >60 ML/MIN/1.73 M^2
GLUCOSE SERPL-MCNC: 102 MG/DL (ref 70–110)
GLUCOSE UR QL STRIP: NEGATIVE
HCT VFR BLD AUTO: 39.2 % (ref 37–48.5)
HGB BLD-MCNC: 12.4 G/DL (ref 12–16)
HGB UR QL STRIP: ABNORMAL
IMM GRANULOCYTES # BLD AUTO: 0.01 K/UL (ref 0–0.04)
IMM GRANULOCYTES NFR BLD AUTO: 0.2 % (ref 0–0.5)
INFLUENZA A, MOLECULAR: NEGATIVE
INFLUENZA B, MOLECULAR: NEGATIVE
KETONES UR QL STRIP: ABNORMAL
LEUKOCYTE ESTERASE UR QL STRIP: NEGATIVE
LYMPHOCYTES # BLD AUTO: 1.9 K/UL (ref 1–4.8)
LYMPHOCYTES NFR BLD: 28.8 % (ref 18–48)
MCH RBC QN AUTO: 30.5 PG (ref 27–31)
MCHC RBC AUTO-ENTMCNC: 31.6 G/DL (ref 32–36)
MCV RBC AUTO: 96 FL (ref 82–98)
MONOCYTES # BLD AUTO: 0.5 K/UL (ref 0.3–1)
MONOCYTES NFR BLD: 7.5 % (ref 4–15)
NEUTROPHILS # BLD AUTO: 3.6 K/UL (ref 1.8–7.7)
NEUTROPHILS NFR BLD: 54.6 % (ref 38–73)
NITRITE UR QL STRIP: NEGATIVE
NRBC BLD-RTO: 0 /100 WBC
PH UR STRIP: 6 [PH] (ref 5–8)
PLATELET # BLD AUTO: 220 K/UL (ref 150–350)
PMV BLD AUTO: 11.2 FL (ref 9.2–12.9)
POTASSIUM SERPL-SCNC: 3.6 MMOL/L (ref 3.5–5.1)
PROT SERPL-MCNC: 6.8 G/DL (ref 6–8.4)
PROT UR QL STRIP: NEGATIVE
RBC # BLD AUTO: 4.07 M/UL (ref 4–5.4)
SODIUM SERPL-SCNC: 140 MMOL/L (ref 136–145)
SP GR UR STRIP: 1.02 (ref 1–1.03)
SPECIMEN SOURCE: NORMAL
URN SPEC COLLECT METH UR: ABNORMAL
UROBILINOGEN UR STRIP-ACNC: NEGATIVE EU/DL
WBC # BLD AUTO: 6.53 K/UL (ref 3.9–12.7)

## 2019-10-08 PROCEDURE — 99285 EMERGENCY DEPT VISIT HI MDM: CPT | Mod: 25,ER

## 2019-10-08 PROCEDURE — 80053 COMPREHEN METABOLIC PANEL: CPT | Mod: ER

## 2019-10-08 PROCEDURE — 93010 ELECTROCARDIOGRAM REPORT: CPT | Mod: ,,, | Performed by: INTERNAL MEDICINE

## 2019-10-08 PROCEDURE — 36000 PLACE NEEDLE IN VEIN: CPT | Mod: ER

## 2019-10-08 PROCEDURE — 81025 URINE PREGNANCY TEST: CPT | Mod: ER

## 2019-10-08 PROCEDURE — 93005 ELECTROCARDIOGRAM TRACING: CPT | Mod: ER

## 2019-10-08 PROCEDURE — 93010 EKG 12-LEAD: ICD-10-PCS | Mod: ,,, | Performed by: INTERNAL MEDICINE

## 2019-10-08 PROCEDURE — 87502 INFLUENZA DNA AMP PROBE: CPT | Mod: ER

## 2019-10-08 PROCEDURE — 81003 URINALYSIS AUTO W/O SCOPE: CPT | Mod: ER

## 2019-10-08 PROCEDURE — 85025 COMPLETE CBC W/AUTO DIFF WBC: CPT | Mod: ER

## 2019-10-08 RX ORDER — AMLODIPINE BESYLATE 5 MG/1
5 TABLET ORAL DAILY
Qty: 30 TABLET | Refills: 0 | Status: SHIPPED | OUTPATIENT
Start: 2019-10-08 | End: 2020-01-05

## 2019-10-08 NOTE — ED TRIAGE NOTES
Patient presents to ED with c/o light headedness and heart palpitations.  Patient has a history of heart palpitations but states has never felt light headed with the episodes.

## 2019-10-08 NOTE — ED PROVIDER NOTES
Encounter Date: 10/8/2019       History     Chief Complaint   Patient presents with    Dizziness     Pt c/o lightheadedness and dizziness.     The history is provided by the patient.   Dizziness   This is a recurrent problem. The current episode started 1 to 2 hours ago. The problem has been gradually improving. Pertinent negatives include no chest pain, no abdominal pain, no headaches and no shortness of breath. Nothing aggravates the symptoms. Nothing relieves the symptoms.     Review of patient's allergies indicates:   Allergen Reactions    Egg white Blisters    Strawberry Shortness Of Breath and Blisters    Tomato (solanum lycopersicum) Shortness Of Breath and Blisters    Oranges [orange]     Wheat containing prod Hives     Past Medical History:   Diagnosis Date    Abnormal Pap smear of cervix     Seasonal allergies      Past Surgical History:   Procedure Laterality Date    LOOP ELECTROSURGICAL EXCISION PROCEDURE (LEEP)       Family History   Problem Relation Age of Onset    Heart failure Mother     Aneurysm Father     Breast cancer Sister      Social History     Tobacco Use    Smoking status: Never Smoker    Smokeless tobacco: Never Used   Substance Use Topics    Alcohol use: No     Frequency: Monthly or less     Drinks per session: 1 or 2     Binge frequency: Never    Drug use: No     Review of Systems   Constitutional: Negative for fever.   HENT: Negative for sore throat.    Respiratory: Negative for shortness of breath.    Cardiovascular: Positive for palpitations. Negative for chest pain.   Gastrointestinal: Negative for abdominal pain and nausea.   Genitourinary: Negative for dysuria.   Musculoskeletal: Negative for back pain.   Skin: Negative for rash.   Neurological: Positive for dizziness. Negative for weakness and headaches.   Hematological: Does not bruise/bleed easily.       Physical Exam     Initial Vitals [10/08/19 0743]   BP Pulse Resp Temp SpO2   (!) 153/96 92 18 98 °F (36.7 °C)  100 %      MAP       --         Physical Exam    Nursing note and vitals reviewed.  Constitutional: She appears well-developed and well-nourished. No distress.   HENT:   Head: Normocephalic and atraumatic.   Mouth/Throat: Oropharynx is clear and moist.   Eyes: Conjunctivae and EOM are normal. Pupils are equal, round, and reactive to light.   Neck: Normal range of motion. Neck supple.   Cardiovascular: Normal rate, regular rhythm and normal heart sounds. Exam reveals no gallop and no friction rub.    No murmur heard.  Pulmonary/Chest: Breath sounds normal. No respiratory distress. She has no wheezes. She has no rhonchi. She has no rales.   Abdominal: Soft. Bowel sounds are normal. She exhibits no distension and no mass. There is no tenderness. There is no rebound and no guarding.   Musculoskeletal: Normal range of motion. She exhibits no edema or tenderness.   Neurological: She is alert and oriented to person, place, and time. She has normal strength.   Skin: Skin is warm and dry. No rash noted.   Psychiatric: She has a normal mood and affect. Thought content normal.         ED Course   Procedures  Labs Reviewed   CBC W/ AUTO DIFFERENTIAL - Abnormal; Notable for the following components:       Result Value    Mean Corpuscular Hemoglobin Conc 31.6 (*)     Eosinophil% 8.1 (*)     All other components within normal limits   COMPREHENSIVE METABOLIC PANEL - Abnormal; Notable for the following components:    Alkaline Phosphatase 54 (*)     Anion Gap 7 (*)     All other components within normal limits   URINALYSIS, REFLEX TO URINE CULTURE - Abnormal; Notable for the following components:    Ketones, UA Trace (*)     Occult Blood UA Trace (*)     All other components within normal limits    Narrative:     Preferred Collection Type->Urine, Clean Catch   INFLUENZA A & B BY MOLECULAR   PREGNANCY TEST, URINE RAPID     EKG Readings: (Independently Interpreted)   Initial Reading: No STEMI. Rhythm: Normal Sinus Rhythm. Ectopy: No  "Ectopy. Conduction: Normal. ST Segments: Normal ST Segments. T Waves: Normal. Clinical Impression: Normal Sinus Rhythm     ECG Results          EKG 12-lead (In process)  Result time 10/08/19 09:14:02    In process by Interface, Lab In [facility] (10/08/19 09:14:02)                 Narrative:    Test Reason : R00.2,    Vent. Rate : 093 BPM     Atrial Rate : 093 BPM     P-R Int : 158 ms          QRS Dur : 082 ms      QT Int : 384 ms       P-R-T Axes : 060 -14 010 degrees     QTc Int : 477 ms    Normal sinus rhythm  Low voltage QRS  Borderline Abnormal ECG  No previous ECGs available    Referred By: [email or web address]   SELF           Confirmed By:                             Imaging Results          X-Ray Chest PA And Lateral (In process)                   ED Vital Signs:  Vitals:    10/08/19 0743 10/08/19 0810 10/08/19 0817 10/08/19 0821   BP: (!) 153/96 (!) 140/85 (!) 144/98 139/89   Pulse: 92 82 97 73   Resp: 18 15 (!) 40 17   Temp: 98 °F (36.7 °C)      TempSrc: Oral      SpO2: 100%  100% 100%   Weight: 73 kg (160 lb 15 oz)      Height: 5' 7" (1.702 m)       10/08/19 0822 10/08/19 0823 10/08/19 0831 10/08/19 0900   BP:  137/89 134/88 132/77   Pulse: 70 74 77 95   Resp: (!) 22 (!) 28 (!) 25 16   Temp:       TempSrc:       SpO2: 100% 100% 100% 100%   Weight:       Height:             Abnormal Lab Results:  Labs Reviewed   CBC W/ AUTO DIFFERENTIAL - Abnormal; Notable for the following components:       Result Value    Mean Corpuscular Hemoglobin Conc 31.6 (*)     Eosinophil% 8.1 (*)     All other components within normal limits   COMPREHENSIVE METABOLIC PANEL - Abnormal; Notable for the following components:    Alkaline Phosphatase 54 (*)     Anion Gap 7 (*)     All other components within normal limits   URINALYSIS, REFLEX TO URINE CULTURE - Abnormal; Notable for the following components:    Ketones, UA Trace (*)     Occult Blood UA Trace (*)     All other components within normal limits    Narrative:     Preferred Collection " Type->Urine, Clean Catch   INFLUENZA A & B BY MOLECULAR   PREGNANCY TEST, URINE RAPID          All Lab Results:  Results for orders placed or performed during the hospital encounter of 10/08/19   Influenza A & B by Molecular   Result Value Ref Range    Influenza A, Molecular Negative Negative    Influenza B, Molecular Negative Negative    Flu A & B Source NP    CBC auto differential   Result Value Ref Range    WBC 6.53 3.90 - 12.70 K/uL    RBC 4.07 4.00 - 5.40 M/uL    Hemoglobin 12.4 12.0 - 16.0 g/dL    Hematocrit 39.2 37.0 - 48.5 %    Mean Corpuscular Volume 96 82 - 98 fL    Mean Corpuscular Hemoglobin 30.5 27.0 - 31.0 pg    Mean Corpuscular Hemoglobin Conc 31.6 (L) 32.0 - 36.0 g/dL    RDW 13.2 11.5 - 14.5 %    Platelets 220 150 - 350 K/uL    MPV 11.2 9.2 - 12.9 fL    Immature Granulocytes 0.2 0.0 - 0.5 %    Gran # (ANC) 3.6 1.8 - 7.7 K/uL    Immature Grans (Abs) 0.01 0.00 - 0.04 K/uL    Lymph # 1.9 1.0 - 4.8 K/uL    Mono # 0.5 0.3 - 1.0 K/uL    Eos # 0.5 0.0 - 0.5 K/uL    Baso # 0.05 0.00 - 0.20 K/uL    nRBC 0 0 /100 WBC    Gran% 54.6 38.0 - 73.0 %    Lymph% 28.8 18.0 - 48.0 %    Mono% 7.5 4.0 - 15.0 %    Eosinophil% 8.1 (H) 0.0 - 8.0 %    Basophil% 0.8 0.0 - 1.9 %    Differential Method Automated    Comprehensive metabolic panel   Result Value Ref Range    Sodium 140 136 - 145 mmol/L    Potassium 3.6 3.5 - 5.1 mmol/L    Chloride 110 95 - 110 mmol/L    CO2 23 23 - 29 mmol/L    Glucose 102 70 - 110 mg/dL    BUN, Bld 17 6 - 20 mg/dL    Creatinine 0.9 0.5 - 1.4 mg/dL    Calcium 8.7 8.7 - 10.5 mg/dL    Total Protein 6.8 6.0 - 8.4 g/dL    Albumin 3.7 3.5 - 5.2 g/dL    Total Bilirubin 0.6 0.1 - 1.0 mg/dL    Alkaline Phosphatase 54 (L) 55 - 135 U/L    AST 16 10 - 40 U/L    ALT 17 10 - 44 U/L    Anion Gap 7 (L) 8 - 16 mmol/L    eGFR if African American >60.0 >60 mL/min/1.73 m^2    eGFR if non African American >60.0 >60 mL/min/1.73 m^2   Urinalysis, Reflex to Urine Culture Urine, Clean Catch   Result Value Ref Range     Specimen UA Urine, Clean Catch     Color, UA Yellow Yellow, Straw, Brie    Appearance, UA Clear Clear    pH, UA 6.0 5.0 - 8.0    Specific Gravity, UA 1.025 1.005 - 1.030    Protein, UA Negative Negative    Glucose, UA Negative Negative    Ketones, UA Trace (A) Negative    Bilirubin (UA) Negative Negative    Occult Blood UA Trace (A) Negative    Nitrite, UA Negative Negative    Urobilinogen, UA Negative <2.0 EU/dL    Leukocytes, UA Negative Negative   Pregnancy, urine rapid   Result Value Ref Range    Preg Test, Ur Negative            Imaging Results:  Imaging Results          X-Ray Chest PA And Lateral (In process)                    The Emergency Provider reviewed the vital signs and test results, which are outlined above.    ED Discussions:  9:17 AM: Reassessed pt at this time.  Pt states her condition has improved at this time. Discussed with pt all pertinent ED information and results. Discussed pt dx of palpitations and plan of tx. Gave pt all f/u and return to the ED instructions. All questions and concerns were addressed at this time. Pt expresses understanding of information and instructions, and is comfortable with plan to discharge. Pt is stable for discharge.                                 Clinical Impression:       ICD-10-CM ICD-9-CM   1. Lightheadedness R42 780.4   2. Palpitations R00.2 785.1   3. Essential hypertension I10 401.9           Disposition:   Disposition: Discharged  Condition: Stable                        Cody Angel MD  10/08/19 0944

## 2020-01-03 ENCOUNTER — OFFICE VISIT (OUTPATIENT)
Dept: INTERNAL MEDICINE | Facility: CLINIC | Age: 35
End: 2020-01-03
Payer: COMMERCIAL

## 2020-01-03 VITALS
HEART RATE: 77 BPM | WEIGHT: 157.19 LBS | DIASTOLIC BLOOD PRESSURE: 89 MMHG | OXYGEN SATURATION: 100 % | BODY MASS INDEX: 24.67 KG/M2 | TEMPERATURE: 97 F | HEIGHT: 67 IN | RESPIRATION RATE: 16 BRPM | SYSTOLIC BLOOD PRESSURE: 127 MMHG

## 2020-01-03 DIAGNOSIS — M54.2 NECK PAIN: ICD-10-CM

## 2020-01-03 DIAGNOSIS — J06.9 VIRAL URI: Primary | ICD-10-CM

## 2020-01-03 DIAGNOSIS — R00.2 PALPITATIONS: ICD-10-CM

## 2020-01-03 PROCEDURE — 3074F PR MOST RECENT SYSTOLIC BLOOD PRESSURE < 130 MM HG: ICD-10-PCS | Mod: CPTII,S$GLB,, | Performed by: NURSE PRACTITIONER

## 2020-01-03 PROCEDURE — 3074F SYST BP LT 130 MM HG: CPT | Mod: CPTII,S$GLB,, | Performed by: NURSE PRACTITIONER

## 2020-01-03 PROCEDURE — 99214 OFFICE O/P EST MOD 30 MIN: CPT | Mod: S$GLB,,, | Performed by: NURSE PRACTITIONER

## 2020-01-03 PROCEDURE — 3008F PR BODY MASS INDEX (BMI) DOCUMENTED: ICD-10-PCS | Mod: CPTII,S$GLB,, | Performed by: NURSE PRACTITIONER

## 2020-01-03 PROCEDURE — 3079F PR MOST RECENT DIASTOLIC BLOOD PRESSURE 80-89 MM HG: ICD-10-PCS | Mod: CPTII,S$GLB,, | Performed by: NURSE PRACTITIONER

## 2020-01-03 PROCEDURE — 99999 PR PBB SHADOW E&M-EST. PATIENT-LVL IV: CPT | Mod: PBBFAC,,, | Performed by: NURSE PRACTITIONER

## 2020-01-03 PROCEDURE — 3079F DIAST BP 80-89 MM HG: CPT | Mod: CPTII,S$GLB,, | Performed by: NURSE PRACTITIONER

## 2020-01-03 PROCEDURE — 3008F BODY MASS INDEX DOCD: CPT | Mod: CPTII,S$GLB,, | Performed by: NURSE PRACTITIONER

## 2020-01-03 PROCEDURE — 99214 PR OFFICE/OUTPT VISIT, EST, LEVL IV, 30-39 MIN: ICD-10-PCS | Mod: S$GLB,,, | Performed by: NURSE PRACTITIONER

## 2020-01-03 PROCEDURE — 99999 PR PBB SHADOW E&M-EST. PATIENT-LVL IV: ICD-10-PCS | Mod: PBBFAC,,, | Performed by: NURSE PRACTITIONER

## 2020-01-03 RX ORDER — MELOXICAM 7.5 MG/1
7.5 TABLET ORAL DAILY
Qty: 30 TABLET | Refills: 0 | Status: SHIPPED | OUTPATIENT
Start: 2020-01-03 | End: 2020-06-23

## 2020-01-03 RX ORDER — MECLIZINE HYDROCHLORIDE 25 MG/1
TABLET ORAL
COMMUNITY
Start: 2019-10-28 | End: 2020-05-26 | Stop reason: SDUPTHER

## 2020-01-03 RX ORDER — MECLIZINE HYDROCHLORIDE 25 MG/1
TABLET ORAL
COMMUNITY
Start: 2019-12-23 | End: 2020-01-03 | Stop reason: SDUPTHER

## 2020-01-03 RX ORDER — METOPROLOL SUCCINATE 25 MG/1
TABLET, EXTENDED RELEASE ORAL
COMMUNITY
Start: 2019-10-28 | End: 2023-07-10 | Stop reason: SDUPTHER

## 2020-01-03 RX ORDER — TIZANIDINE 4 MG/1
4 TABLET ORAL EVERY 6 HOURS PRN
Qty: 30 TABLET | Refills: 0 | Status: SHIPPED | OUTPATIENT
Start: 2020-01-03 | End: 2020-01-13

## 2020-01-03 NOTE — PROGRESS NOTES
Subjective:       Patient ID: Magalis Russell is a 34 y.o. female.    Chief Complaint: Sinus Problem (drainage) and Nasal Congestion (stiffness necks)    Mrs. Russell is here today for nasal congestion and bilateral neck pain  , and a routine health exam.     Sinus Problem   This is a new problem. The current episode started in the past 7 days. The problem has been gradually worsening since onset. There has been no fever. Associated symptoms include chills, congestion, coughing, neck pain, sinus pressure and sneezing. Pertinent negatives include no ear pain, headaches, shortness of breath or sore throat. Past treatments include oral decongestants (OCT cough suppressant, claritin, therflu). The treatment provided mild relief.   Neck Pain    This is a new problem. The current episode started in the past 7 days (5 days). The problem occurs intermittently. The problem has been waxing and waning. The pain is associated with nothing. The pain is present in the right side and left side. The pain is at a severity of 2/10. The pain is mild. The symptoms are aggravated by position and twisting. Pertinent negatives include no chest pain, fever, headaches, leg pain, numbness, pain with swallowing, photophobia, syncope, tingling, trouble swallowing or weakness. She has tried acetaminophen for the symptoms. The treatment provided mild relief.       Patient Active Problem List   Diagnosis    Urinary frequency    Counseling for birth control regarding intrauterine device (IUD)    Atypical squamous cells cannot exclude high grade squamous intraepithelial lesion on cytologic smear of cervix (ASC-H)    Severe dysplasia of cervix (SINDY III)    Candidiasis of vulva and vagina    Palpitations    Lower extremity pain, anterior, left       Family History   Problem Relation Age of Onset    Heart failure Mother     Aneurysm Father     Breast cancer Sister      Past Surgical History:   Procedure Laterality Date    LOOP ELECTROSURGICAL  EXCISION PROCEDURE (LEEP)           Current Outpatient Medications:     meclizine (ANTIVERT) 25 mg tablet, , Disp: , Rfl:     metoprolol succinate (TOPROL-XL) 25 MG 24 hr tablet, , Disp: , Rfl:     norgestimate-ethinyl estradiol (SPRINTEC, 28,) 0.25-35 mg-mcg per tablet, Take 1 tablet by mouth once daily., Disp: 30 tablet, Rfl: 2    EPINEPHrine (EPIPEN) 0.3 mg/0.3 mL AtIn, Inject 0.3 mLs (0.3 mg total) into the muscle once. for 1 dose, Disp: 2 each, Rfl: 0    fluticasone propionate (FLONASE) 50 mcg/actuation nasal spray, 1 spray by Each Nostril route once daily., Disp: , Rfl: 0    meloxicam (MOBIC) 7.5 MG tablet, Take 1 tablet (7.5 mg total) by mouth once daily., Disp: 30 tablet, Rfl: 0    tiZANidine (ZANAFLEX) 4 MG tablet, Take 1 tablet (4 mg total) by mouth every 6 (six) hours as needed., Disp: 30 tablet, Rfl: 0    Review of Systems   Constitutional: Positive for chills and fatigue. Negative for activity change, fever and unexpected weight change.   HENT: Positive for congestion, postnasal drip, rhinorrhea, sinus pressure, sinus pain and sneezing. Negative for ear pain, hearing loss, sore throat and trouble swallowing.    Eyes: Negative for photophobia, discharge and visual disturbance.   Respiratory: Positive for cough. Negative for chest tightness, shortness of breath and wheezing.    Cardiovascular: Negative for chest pain, palpitations and syncope.   Gastrointestinal: Negative for blood in stool, constipation, diarrhea and vomiting.   Endocrine: Negative for polydipsia and polyuria.   Genitourinary: Negative for difficulty urinating, dysuria, hematuria and menstrual problem.   Musculoskeletal: Positive for arthralgias and neck pain. Negative for joint swelling.   Neurological: Negative for dizziness, tingling, weakness, light-headedness, numbness and headaches.   Psychiatric/Behavioral: Negative for confusion and dysphoric mood.       Objective:   /89 (BP Location: Left arm, Patient Position:  "Sitting, BP Method: Medium (Automatic))   Pulse 77   Temp 97 °F (36.1 °C) (Tympanic)   Resp 16   Ht 5' 7" (1.702 m)   Wt 71.3 kg (157 lb 3 oz)   LMP 12/22/2019   SpO2 100%   BMI 24.62 kg/m²      Physical Exam   Constitutional: She is oriented to person, place, and time. She appears well-developed and well-nourished. She is cooperative. She does not appear ill. No distress.   HENT:   Head: Normocephalic and atraumatic.   Right Ear: Tympanic membrane is not injected and not erythematous. A middle ear effusion is present.   Left Ear: Tympanic membrane is not injected and not erythematous. A middle ear effusion is present.   Nose: Mucosal edema and rhinorrhea present. Right sinus exhibits no maxillary sinus tenderness and no frontal sinus tenderness. Left sinus exhibits no maxillary sinus tenderness and no frontal sinus tenderness.   Mouth/Throat: Uvula is midline, oropharynx is clear and moist and mucous membranes are normal.   Eyes: Pupils are equal, round, and reactive to light. Conjunctivae and EOM are normal.   Neck: Normal range of motion. Neck supple.   Cardiovascular: Normal rate, regular rhythm, normal heart sounds and intact distal pulses. Exam reveals no gallop and no friction rub.   No murmur heard.  Pulmonary/Chest: Effort normal and breath sounds normal. No respiratory distress. She has no wheezes. She has no rales.   Abdominal: Soft. Bowel sounds are normal. There is no tenderness.   Musculoskeletal: Normal range of motion. She exhibits no edema, tenderness or deformity.   Lymphadenopathy:     She has no cervical adenopathy.   Neurological: She is alert and oriented to person, place, and time. No cranial nerve deficit.   Skin: Skin is warm and dry. Capillary refill takes less than 2 seconds. No rash noted. She is not diaphoretic. No erythema. No pallor.   Psychiatric: She has a normal mood and affect. Her behavior is normal.   Vitals reviewed.      Assessment & Plan     Problem List Items " Addressed This Visit        Cardiac/Vascular    Palpitations    Current Assessment & Plan     Avoid decongestants. SVT followed by Dr. Thurston. Continue metoprolol.            Other Visit Diagnoses     Viral URI    -  Primary    Flonase and Zyrtec for symptomatic relief.   Avoid decongestants with hx of SVT. SVT followed bt Dr. Thurston.     Neck pain        Stretches  Heating pad  NSAIDS and muscle relaxer.     Relevant Medications    meloxicam (MOBIC) 7.5 MG tablet    tiZANidine (ZANAFLEX) 4 MG tablet        Follow up if symptoms worsen or fail to improve.

## 2020-01-03 NOTE — PATIENT INSTRUCTIONS
Neck Problems: Relieving Your Symptoms  The first goal of treatment is to relieve your symptoms. Your healthcare provider may recommend self-care treatments. These include resting, applying ice and heat, taking medicine, and doing exercises. Your healthcare provider may also recommend that you see a physical therapist who can teach you ways to care for and strengthen your neck.     Heat relaxes sore muscles and helps relieve spasms.   Self-care treatments  Pain can end quickly or last awhile. Either way, youll want relief as soon as possible. Your healthcare provider can tell you which treatments to do at home to help relieve your pain.  · Lying down for a short time takes pressure from the head off the neck.  · Ice and heat can help reduce pain. To bring down swelling, rest an ice pack wrapped in a thin towel on your neck for 10 to 15 minutes. To relax sore muscles, apply a warm, wet towel to the area. Or you can take a warm bath or shower.  · Over-the-counter medicines, such as ibuprofen, naproxen, and aspirin, can help reduce pain and swelling. Acetaminophen can help relieve pain. Use these only as directed.  · Exercises can relax muscles and ease stiffness. To prepare, drape a warm, wet towel around your neck and shoulders for 5 minutes. Remove the towel. Then do any exercises recommended to you by your healthcare provider.  Physical therapy  If self-care treatments arent helping relieve neck pain, your healthcare provider may suggest physical therapy. Physical therapy is done by a specialist trained to treat injuries. Your physical therapist (PT) will teach you how to strengthen muscles, improve the spines alignment, and help you move properly. Treatment methods used in physical therapy may include:  · Heat. A special heating pad called a neck pack may be applied to your neck.  · Exercises. Your PT will teach you exercises to help strengthen your neck and improve its range of motion.  · Joint  mobilization. The PT gently moves your vertebrae to help restore motion in your neck joints and reduce neck pain.  · Soft tissue mobilization. The PT massages and stretches the muscles in your neck and shoulders.  · Electrical stimulation. Electrical impulses are sent into your neck. This helps reduce soreness and inflammation.  · Education in body mechanics. The PT shows you ways to position and move your body that protect the neck.  Other treatments  If physical therapy doesnt relieve your neck pain, your healthcare provider may suggest other treatments. For example, medicines or injections can help relieve pain and swelling. In some cases, surgery may be needed to treat neck problems.  Date Last Reviewed: 8/23/2015 © 2000-2017 [employer]. [address] [place] [address], [place] [address]. All rights reserved. This information is not intended as a substitute for professional medical care. Always follow your healthcare professional's instructions.        Viral Upper Respiratory Illness (Adult)  You have a viral upper respiratory illness (URI), which is another term for the common cold. This illness is contagious during the first few days. It is spread through the air by coughing and sneezing. It may also be spread by direct contact (touching the sick person and then touching your own eyes, nose, or mouth). Frequent handwashing will decrease risk of spread. Most viral illnesses go away within 7 to 10 days with rest and simple home remedies. Sometimes the illness may last for several weeks. Antibiotics will not kill a virus, and they are generally not prescribed for this condition.    Home care  · If symptoms are severe, rest at home for the first 2 to 3 days. When you resume activity, don't let yourself get too tired.  · Avoid being exposed to cigarette smoke (yours or others).  · You may use acetaminophen or ibuprofen to control pain and fever, unless another medicine was prescribed. (Note: If you have  chronic liver or kidney disease, have ever had a stomach ulcer or gastrointestinal bleeding, or are taking blood-thinning medicines, talk with your healthcare provider before using these medicines.) Aspirin should never be given to anyone under 18 years of age who is ill with a viral infection or fever. It may cause severe liver or brain damage.  · Your appetite may be poor, so a light diet is fine. Avoid dehydration by drinking 6 to 8 glasses of fluids per day (water, soft drinks, juices, tea, or soup). Extra fluids will help loosen secretions in the nose and lungs.  · Over-the-counter cold medicines will not shorten the length of time youre sick, but they may be helpful for the following symptoms: cough, sore throat, and nasal and sinus congestion. (Note: Do not use decongestants if you have high blood pressure.)  Follow-up care  Follow up with your healthcare provider, or as advised.  When to seek medical advice  Call your healthcare provider right away if any of these occur:  · Cough with lots of colored sputum (mucus)  · Severe headache; face, neck, or ear pain  · Difficulty swallowing due to throat pain  · Fever of 100.4°F (38°C)  Call 911, or get immediate medical care  Call emergency services right away if any of these occur:  · Chest pain, shortness of breath, wheezing, or difficulty breathing  · Coughing up blood  · Inability to swallow due to throat pain  Date Last Reviewed: 9/13/2015  © 1066-8623 [employer] [address] [place] [address]. All rights reserved. This information is not intended as a substitute for professional medical care. Always follow your healthcare professional's instructions.        Exercises: Neck Isometrics  To start, sit in a chair with your feet flat on the floor. Your weight should be slightly forward so that youre balanced evenly on your buttocks. Relax your shoulders and keep your head level. Using a chair with arms may help you keep your balance.        1. Press your palm against your forehead. Resist with your neck muscles. Hold for 10 seconds. Relax. Repeat 5 times.  2. Do the exercise again, pressing on the side of your head. Repeat 5 times. Switch sides.  3. Do the exercise again, pressing on the back of your head. Repeat 5 times.  For your safety, check with your healthcare provider before starting an exercise program.   Date Last Reviewed: 8/16/2015  © 6748-2489 [employer]. [address] [place] [address]. All rights reserved. This information is not intended as a substitute for professional medical care. Always follow your healthcare professional's instructions.

## 2020-01-04 PROCEDURE — 99281 EMR DPT VST MAYX REQ PHY/QHP: CPT | Mod: ER

## 2020-01-05 ENCOUNTER — HOSPITAL ENCOUNTER (EMERGENCY)
Facility: HOSPITAL | Age: 35
Discharge: HOME | End: 2020-01-05
Attending: EMERGENCY MEDICINE
Payer: COMMERCIAL

## 2020-01-05 VITALS
OXYGEN SATURATION: 100 % | SYSTOLIC BLOOD PRESSURE: 153 MMHG | DIASTOLIC BLOOD PRESSURE: 86 MMHG | HEART RATE: 98 BPM | TEMPERATURE: 98 F | BODY MASS INDEX: 24.52 KG/M2 | RESPIRATION RATE: 18 BRPM | WEIGHT: 156.5 LBS

## 2020-01-05 DIAGNOSIS — R03.0 ELEVATED BLOOD PRESSURE READING: Primary | ICD-10-CM

## 2020-01-05 NOTE — ED PROVIDER NOTES
Encounter Date: 1/4/2020       History     Chief Complaint   Patient presents with    Hypertension     Pt reports b/p of 151/94 at home.     Patient currently presents with complaint of hypertension.  This was first noted earlier this evening.  Denies headache, visual changes, chest pain, shortness of breath, abdominal pain, or other focal findings at this time.  Patient is not on antihypertensives medications at home.  Patient denies a history of renal insufficiency.  Patient denies history of CHF and CAD.  There is not a history of prior TIA or stroke.            Review of patient's allergies indicates:   Allergen Reactions    Egg white Blisters    Strawberry Shortness Of Breath and Blisters    Tomato (solanum lycopersicum) Shortness Of Breath and Blisters    Oranges [orange]     Wheat containing prod Hives     Past Medical History:   Diagnosis Date    Abnormal Pap smear of cervix     Seasonal allergies     SVT (supraventricular tachycardia)     Vertigo      Past Surgical History:   Procedure Laterality Date    LOOP ELECTROSURGICAL EXCISION PROCEDURE (LEEP)       Family History   Problem Relation Age of Onset    Heart failure Mother     Aneurysm Father     Breast cancer Sister      Social History     Tobacco Use    Smoking status: Never Smoker    Smokeless tobacco: Never Used   Substance Use Topics    Alcohol use: No     Frequency: Monthly or less     Drinks per session: 1 or 2     Binge frequency: Never    Drug use: No     Review of Systems   Constitutional: Negative for chills and fever.   HENT: Negative for congestion and rhinorrhea.    Respiratory: Negative for cough, chest tightness, shortness of breath and wheezing.    Cardiovascular: Negative for chest pain, palpitations and leg swelling.   Gastrointestinal: Negative for abdominal pain, constipation, diarrhea, nausea and vomiting.   Genitourinary: Negative for dysuria, frequency, urgency, vaginal bleeding and vaginal discharge.   Skin:  Negative for color change and rash.   Allergic/Immunologic: Negative for immunocompromised state.   Neurological: Negative for dizziness, weakness and numbness.   Hematological: Negative for adenopathy. Does not bruise/bleed easily.   All other systems reviewed and are negative.      Physical Exam     Initial Vitals [01/05/20 0004]   BP Pulse Resp Temp SpO2   (!) 153/86 98 18 98 °F (36.7 °C) 100 %      MAP       --             Physical Exam    Nursing note and vitals reviewed.  Constitutional: She appears well-developed and well-nourished. She is not diaphoretic. No distress.   HENT:   Head: Normocephalic and atraumatic.   Right Ear: External ear normal.   Left Ear: External ear normal.   Nose: Nose normal.   Mouth/Throat: Oropharynx is clear and moist.   Eyes: Conjunctivae and EOM are normal. Pupils are equal, round, and reactive to light. No scleral icterus.   Neck: Neck supple. No tracheal deviation present. No JVD present.   Cardiovascular: Normal rate, regular rhythm, normal heart sounds and intact distal pulses. Exam reveals no gallop and no friction rub.    No murmur heard.  Pulmonary/Chest: Breath sounds normal. No respiratory distress. She has no wheezes. She has no rhonchi. She has no rales.   Abdominal: Soft. Bowel sounds are normal. She exhibits no distension. There is no tenderness.   Musculoskeletal: Normal range of motion. She exhibits no edema.   Neurological: She is alert and oriented to person, place, and time. She has normal strength. No cranial nerve deficit or sensory deficit. GCS score is 15. GCS eye subscore is 4. GCS verbal subscore is 5. GCS motor subscore is 6.   Skin: Skin is warm and dry. No rash noted.   Psychiatric: She has a normal mood and affect. Her behavior is normal.         ED Course   Procedures  Labs Reviewed - No data to display       Imaging Results    None          Medical Decision Making:   ED Management:  All findings were reviewed with the patient/family in detail.  I see  no indication of an emergent process beyond that addressed during our encounter but have duly counseled the patient/family regarding the need for prompt follow-up as well as the indications that should prompt immediate return to the emergency room should new or worrisome developments occur.  The patient has additionally been provided with printed information regarding diagnosis as well as instructions regarding follow up and any medications intended to manage the patient's aforementioned conditions.  The patient/family communicates understanding of all this information and all remaining questions and concerns were addressed at this time.                                       Clinical Impression:       ICD-10-CM ICD-9-CM   1. Elevated blood pressure reading R03.0 796.2                             Chilo Tan MD  01/05/20 0526

## 2020-01-22 ENCOUNTER — HOSPITAL ENCOUNTER (OUTPATIENT)
Dept: CARDIOLOGY | Facility: CLINIC | Age: 35
Discharge: HOME | End: 2020-01-22
Payer: COMMERCIAL

## 2020-01-22 ENCOUNTER — PATIENT MESSAGE (OUTPATIENT)
Dept: INTERNAL MEDICINE | Facility: CLINIC | Age: 35
End: 2020-01-22

## 2020-01-22 ENCOUNTER — HOSPITAL ENCOUNTER (OUTPATIENT)
Dept: RADIOLOGY | Facility: HOSPITAL | Age: 35
Discharge: HOME | End: 2020-01-22
Attending: FAMILY MEDICINE
Payer: COMMERCIAL

## 2020-01-22 ENCOUNTER — OFFICE VISIT (OUTPATIENT)
Dept: INTERNAL MEDICINE | Facility: CLINIC | Age: 35
End: 2020-01-22
Payer: COMMERCIAL

## 2020-01-22 VITALS
SYSTOLIC BLOOD PRESSURE: 127 MMHG | DIASTOLIC BLOOD PRESSURE: 90 MMHG | WEIGHT: 158.94 LBS | HEIGHT: 67 IN | TEMPERATURE: 99 F | BODY MASS INDEX: 24.94 KG/M2 | HEART RATE: 79 BPM | OXYGEN SATURATION: 99 %

## 2020-01-22 DIAGNOSIS — M54.12 CERVICAL RADICULOPATHY: Primary | ICD-10-CM

## 2020-01-22 DIAGNOSIS — M54.2 CERVICALGIA: Primary | ICD-10-CM

## 2020-01-22 DIAGNOSIS — Z86.79 HISTORY OF SUPRAVENTRICULAR TACHYCARDIA: Primary | ICD-10-CM

## 2020-01-22 DIAGNOSIS — R00.2 INTERMITTENT PALPITATIONS: ICD-10-CM

## 2020-01-22 DIAGNOSIS — Z86.79 HISTORY OF SUPRAVENTRICULAR TACHYCARDIA: ICD-10-CM

## 2020-01-22 DIAGNOSIS — M54.2 CERVICALGIA: ICD-10-CM

## 2020-01-22 PROBLEM — I47.10 SVT (SUPRAVENTRICULAR TACHYCARDIA): Status: ACTIVE | Noted: 2020-01-22

## 2020-01-22 PROCEDURE — 99999 PR PBB SHADOW E&M-EST. PATIENT-LVL III: CPT | Mod: PBBFAC,,, | Performed by: FAMILY MEDICINE

## 2020-01-22 PROCEDURE — 3074F SYST BP LT 130 MM HG: CPT | Mod: CPTII,S$GLB,, | Performed by: FAMILY MEDICINE

## 2020-01-22 PROCEDURE — 3074F PR MOST RECENT SYSTOLIC BLOOD PRESSURE < 130 MM HG: ICD-10-PCS | Mod: CPTII,S$GLB,, | Performed by: FAMILY MEDICINE

## 2020-01-22 PROCEDURE — 99214 OFFICE O/P EST MOD 30 MIN: CPT | Mod: S$GLB,,, | Performed by: FAMILY MEDICINE

## 2020-01-22 PROCEDURE — 99999 PR PBB SHADOW E&M-EST. PATIENT-LVL III: ICD-10-PCS | Mod: PBBFAC,,, | Performed by: FAMILY MEDICINE

## 2020-01-22 PROCEDURE — 93005 ELECTROCARDIOGRAM TRACING: CPT | Mod: S$GLB,,, | Performed by: FAMILY MEDICINE

## 2020-01-22 PROCEDURE — 3008F BODY MASS INDEX DOCD: CPT | Mod: CPTII,S$GLB,, | Performed by: FAMILY MEDICINE

## 2020-01-22 PROCEDURE — 93005 EKG 12-LEAD: ICD-10-PCS | Mod: S$GLB,,, | Performed by: FAMILY MEDICINE

## 2020-01-22 PROCEDURE — 99214 PR OFFICE/OUTPT VISIT, EST, LEVL IV, 30-39 MIN: ICD-10-PCS | Mod: S$GLB,,, | Performed by: FAMILY MEDICINE

## 2020-01-22 PROCEDURE — 72040 X-RAY EXAM NECK SPINE 2-3 VW: CPT | Mod: 26,,, | Performed by: RADIOLOGY

## 2020-01-22 PROCEDURE — 93010 ELECTROCARDIOGRAM REPORT: CPT | Mod: S$GLB,,, | Performed by: INTERNAL MEDICINE

## 2020-01-22 PROCEDURE — 93010 EKG 12-LEAD: ICD-10-PCS | Mod: S$GLB,,, | Performed by: INTERNAL MEDICINE

## 2020-01-22 PROCEDURE — 3080F PR MOST RECENT DIASTOLIC BLOOD PRESSURE >= 90 MM HG: ICD-10-PCS | Mod: CPTII,S$GLB,, | Performed by: FAMILY MEDICINE

## 2020-01-22 PROCEDURE — 3080F DIAST BP >= 90 MM HG: CPT | Mod: CPTII,S$GLB,, | Performed by: FAMILY MEDICINE

## 2020-01-22 PROCEDURE — 72040 XR CERVICAL SPINE 2 OR 3 VIEWS: ICD-10-PCS | Mod: 26,,, | Performed by: RADIOLOGY

## 2020-01-22 PROCEDURE — 72040 X-RAY EXAM NECK SPINE 2-3 VW: CPT | Mod: TC,PO

## 2020-01-22 PROCEDURE — 3008F PR BODY MASS INDEX (BMI) DOCUMENTED: ICD-10-PCS | Mod: CPTII,S$GLB,, | Performed by: FAMILY MEDICINE

## 2020-01-22 NOTE — ASSESSMENT & PLAN NOTE
-feel more so musculoskeletal in origin due to relief with positional changes and heating pad.  Does report similar symptoms a few years ago and was told by physical therapy that she had a pinched nerve.  Did order x-ray to see if there are any bony deformities causing nerve compression due to her symptom presentation.  Will likely refer to PT OT.  Can continue conservative measures
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
## 2020-01-22 NOTE — ASSESSMENT & PLAN NOTE
-follows with Dr. Thurston.  Was diagnosed with SVT last October.  Currently takes metoprolol 25 mg.  -she does have elevated blood pressure reading today.  Her reported home BP blood pressure are usually within good range.  -Will discuss blood pressure and metoprolol dosing with Dr. Thurston at her appointment in a few days

## 2020-01-22 NOTE — PROGRESS NOTES
Xray cervical spine did show some mild to moderate disc space narrowing which may be cause of your symptoms. Will refer to PT/OT. Please call with any questions or concerns!

## 2020-01-24 ENCOUNTER — PATIENT MESSAGE (OUTPATIENT)
Dept: INTERNAL MEDICINE | Facility: CLINIC | Age: 35
End: 2020-01-24

## 2020-03-03 ENCOUNTER — PATIENT MESSAGE (OUTPATIENT)
Dept: OBSTETRICS AND GYNECOLOGY | Facility: CLINIC | Age: 35
End: 2020-03-03

## 2020-03-31 ENCOUNTER — PATIENT MESSAGE (OUTPATIENT)
Dept: INTERNAL MEDICINE | Facility: CLINIC | Age: 35
End: 2020-03-31

## 2020-03-31 ENCOUNTER — TELEPHONE (OUTPATIENT)
Dept: INTERNAL MEDICINE | Facility: CLINIC | Age: 35
End: 2020-03-31

## 2020-03-31 DIAGNOSIS — M54.2 CERVICALGIA: Primary | ICD-10-CM

## 2020-03-31 NOTE — TELEPHONE ENCOUNTER
----- Message from Yolanda Fuite sent at 3/31/2020 12:55 PM CDT -----  Contact: [facility]   [facility] called and stated that the pt would like to continue PT and needs a new order. She can be reached at [phone] fax [phone].    Thanks,  Tf

## 2020-03-31 NOTE — TELEPHONE ENCOUNTER
Patient needs an updated referral with todays date for her Physical/occ therapy at [facility].     Please advise.

## 2020-03-31 NOTE — TELEPHONE ENCOUNTER
----- Message from Lissette Germain-Eldon sent at 3/31/2020  1:41 PM CDT -----  Contact: [facility]- [facility]  They received a referral dated 1/22/20 for patient, but the date needs to be updated and faxed back to [phone], or call her back if needed at [phone]. Thank you

## 2020-04-01 ENCOUNTER — PATIENT MESSAGE (OUTPATIENT)
Dept: INTERNAL MEDICINE | Facility: CLINIC | Age: 35
End: 2020-04-01

## 2020-04-16 ENCOUNTER — TELEPHONE (OUTPATIENT)
Dept: FAMILY MEDICINE | Facility: CLINIC | Age: 35
End: 2020-04-16

## 2020-04-16 DIAGNOSIS — K52.9 CHRONIC DIARRHEA: Primary | ICD-10-CM

## 2020-04-20 ENCOUNTER — PATIENT MESSAGE (OUTPATIENT)
Dept: OBSTETRICS AND GYNECOLOGY | Facility: CLINIC | Age: 35
End: 2020-04-20

## 2020-05-21 ENCOUNTER — TELEPHONE (OUTPATIENT)
Dept: LAB | Facility: HOSPITAL | Age: 35
End: 2020-05-21

## 2020-05-21 NOTE — TELEPHONE ENCOUNTER
I spoke to patient and she stated she is on antihistamines and will call back to reschedule when she has a date that she can come in an refrain from using medicines.    ----- Message from Marla Marsh sent at 5/20/2020  5:31 PM CDT -----  Contact: pt  Type:  Sooner Apoointment Request    Caller is requesting a sooner appointment.  Caller declined first available appointment listed below.  Caller will accept being placed on the waitlist and is requesting a message be sent to doctor.  Name of Caller:Patient  When is the first available appointment?6/1/20  Symptoms:allergies  Would the patient rather a call back or a response via [facility]? callback  Best Call Back Number:[phone]  Additional Information:

## 2020-05-26 ENCOUNTER — OFFICE VISIT (OUTPATIENT)
Dept: INTERNAL MEDICINE | Facility: CLINIC | Age: 35
End: 2020-05-26
Payer: COMMERCIAL

## 2020-05-26 DIAGNOSIS — M54.2 CERVICALGIA: Primary | ICD-10-CM

## 2020-05-26 DIAGNOSIS — H81.10 BENIGN PAROXYSMAL POSITIONAL VERTIGO, UNSPECIFIED LATERALITY: ICD-10-CM

## 2020-05-26 DIAGNOSIS — M54.50 ACUTE BILATERAL LOW BACK PAIN, UNSPECIFIED WHETHER SCIATICA PRESENT: ICD-10-CM

## 2020-05-26 PROCEDURE — 99214 PR OFFICE/OUTPT VISIT, EST, LEVL IV, 30-39 MIN: ICD-10-PCS | Mod: 95,,, | Performed by: FAMILY MEDICINE

## 2020-05-26 PROCEDURE — 99214 OFFICE O/P EST MOD 30 MIN: CPT | Mod: 95,,, | Performed by: FAMILY MEDICINE

## 2020-05-26 RX ORDER — MECLIZINE HYDROCHLORIDE 25 MG/1
25 TABLET ORAL DAILY PRN
Qty: 20 TABLET | Refills: 0 | Status: SHIPPED | OUTPATIENT
Start: 2020-05-26

## 2020-05-26 NOTE — ASSESSMENT & PLAN NOTE
-plans to come in in a few weeks for physical exam.  Until then recommended stretching exercises and also Tylenol as needed.  Will try to avoid NSAIDs as much as possible as she does have SVT although she has taken mobic previously with no issues

## 2020-05-26 NOTE — PROGRESS NOTES
Patient ID: Magalis Russell is a 34 y.o. female.    The patient location is: [place]  The chief complaint leading to consultation is: Back and neck pain    Visit type: audiovisual    Face to Face time with patient: 12 minutes  25 minutes of total time spent on the encounter, which includes face to face time and non-face to face time preparing to see the patient (eg, review of tests), Obtaining and/or reviewing separately obtained history, Documenting clinical information in the electronic or other health record, Independently interpreting results (not separately reported) and communicating results to the patient/family/caregiver, or Care coordination (not separately reported).     Each patient to whom he or she provides medical services by telemedicine is:  (1) informed of the relationship between the physician and patient and the respective role of any other health care provider with respect to management of the patient; and (2) notified that he or she may decline to receive medical services by telemedicine and may withdraw from such care at any time.    Chief Complaint: Neck Pain and Low-back Pain    HPI patient is 34-year-old female seen via telemedicine visit with complaint of neck pain and low back pain.  Four months ago she was referred to physical therapy after cervical x-ray showed mild to moderate disc space narrowing.  Said initially physical therapy helped but feels that now it sometimes worsens her pain.  Reports numbness and tingling that started her neck and radiates down to her hands described as shooting pain. L>R.  Has tried Tylenol and Mobic with some relief.  Also use a TENS unit as well.      Also reports bilateral low back pain aggravated by positional changes such as bending.  Reports some numbness that travels down her legs described as shooting pain.  Describes back pain as nagging and is intermittent.    Family History   Problem Relation Age of Onset    Heart failure Mother     Aneurysm  Father     Breast cancer Sister        Current Outpatient Medications:     EPINEPHrine (EPIPEN) 0.3 mg/0.3 mL AtIn, Inject 0.3 mLs (0.3 mg total) into the muscle once. for 1 dose, Disp: 2 each, Rfl: 0    fluticasone propionate (FLONASE) 50 mcg/actuation nasal spray, 1 spray by Each Nostril route once daily., Disp: , Rfl: 0    meclizine (ANTIVERT) 25 mg tablet, Take 1 tablet (25 mg total) by mouth daily as needed., Disp: 20 tablet, Rfl: 0    meloxicam (MOBIC) 7.5 MG tablet, Take 1 tablet (7.5 mg total) by mouth once daily., Disp: 30 tablet, Rfl: 0    metoprolol succinate (TOPROL-XL) 25 MG 24 hr tablet, , Disp: , Rfl:     norgestimate-ethinyl estradiol (SPRINTEC, 28,) 0.25-35 mg-mcg per tablet, Take 1 tablet by mouth once daily., Disp: 30 tablet, Rfl: 2    Review of Systems   Constitutional: Negative for fever.   Cardiovascular: Negative for chest pain.   Gastrointestinal: Negative for abdominal pain.   Genitourinary: Negative for dysuria, hematuria and pelvic pain.   Musculoskeletal: Positive for back pain.   Neurological: Positive for weakness. Negative for numbness and headaches.       Objective:   There were no vitals taken for this visit.     Physical Exam   Constitutional: She is oriented to person, place, and time. She appears well-developed and well-nourished. No distress.   Pulmonary/Chest: Effort normal. No respiratory distress.   Neurological: She is alert and oriented to person, place, and time.   Skin: She is not diaphoretic.   Psychiatric: She has a normal mood and affect. Her behavior is normal. Judgment and thought content normal.       Assessment & Plan     Problem List Items Addressed This Visit        Orthopedic    Cervicalgia - Primary    Current Assessment & Plan     -will have her come in physical exam in 1-2 weeks.  Pain likely due to some disc space narrowing that was seen on imaging a few months ago         Acute bilateral low back pain    Current Assessment & Plan     -plans to come in  in a few weeks for physical exam.  Until then recommended stretching exercises and also Tylenol as needed.  Will try to avoid NSAIDs as much as possible as she does have SVT although she has taken mobic previously with no issues           Other Visit Diagnoses     Benign paroxysmal positional vertigo, unspecified laterality        -requested refill of meclizine prescribed by her ENT as she has been unable schedule an appointment    Relevant Medications    meclizine (ANTIVERT) 25 mg tablet            Follow up if symptoms worsen or fail to improve.      Disclaimer:  This note may have been prepared using voice recognition software, without extensive proofreading. As such, there could be errors within the text such as sound alike errors.

## 2020-05-26 NOTE — ASSESSMENT & PLAN NOTE
-will have her come in physical exam in 1-2 weeks.  Pain likely due to some disc space narrowing that was seen on imaging a few months ago

## 2020-06-04 ENCOUNTER — OFFICE VISIT (OUTPATIENT)
Dept: INTERNAL MEDICINE | Facility: CLINIC | Age: 35
End: 2020-06-04
Payer: COMMERCIAL

## 2020-06-04 VITALS
HEIGHT: 67 IN | SYSTOLIC BLOOD PRESSURE: 135 MMHG | BODY MASS INDEX: 25.44 KG/M2 | WEIGHT: 162.06 LBS | DIASTOLIC BLOOD PRESSURE: 83 MMHG | TEMPERATURE: 97 F | OXYGEN SATURATION: 96 % | HEART RATE: 75 BPM

## 2020-06-04 DIAGNOSIS — M54.2 NECK PAIN: ICD-10-CM

## 2020-06-04 DIAGNOSIS — R20.2 NUMBNESS AND TINGLING: Primary | ICD-10-CM

## 2020-06-04 DIAGNOSIS — R20.0 NUMBNESS AND TINGLING: Primary | ICD-10-CM

## 2020-06-04 DIAGNOSIS — M54.50 ACUTE BILATERAL LOW BACK PAIN WITHOUT SCIATICA: ICD-10-CM

## 2020-06-04 PROCEDURE — 3079F PR MOST RECENT DIASTOLIC BLOOD PRESSURE 80-89 MM HG: ICD-10-PCS | Mod: CPTII,S$GLB,, | Performed by: FAMILY MEDICINE

## 2020-06-04 PROCEDURE — 3079F DIAST BP 80-89 MM HG: CPT | Mod: CPTII,S$GLB,, | Performed by: FAMILY MEDICINE

## 2020-06-04 PROCEDURE — 3008F PR BODY MASS INDEX (BMI) DOCUMENTED: ICD-10-PCS | Mod: CPTII,S$GLB,, | Performed by: FAMILY MEDICINE

## 2020-06-04 PROCEDURE — 99214 OFFICE O/P EST MOD 30 MIN: CPT | Mod: S$GLB,,, | Performed by: FAMILY MEDICINE

## 2020-06-04 PROCEDURE — 99999 PR PBB SHADOW E&M-EST. PATIENT-LVL IV: ICD-10-PCS | Mod: PBBFAC,,, | Performed by: FAMILY MEDICINE

## 2020-06-04 PROCEDURE — 99999 PR PBB SHADOW E&M-EST. PATIENT-LVL IV: CPT | Mod: PBBFAC,,, | Performed by: FAMILY MEDICINE

## 2020-06-04 PROCEDURE — 99214 PR OFFICE/OUTPT VISIT, EST, LEVL IV, 30-39 MIN: ICD-10-PCS | Mod: S$GLB,,, | Performed by: FAMILY MEDICINE

## 2020-06-04 PROCEDURE — 3075F SYST BP GE 130 - 139MM HG: CPT | Mod: CPTII,S$GLB,, | Performed by: FAMILY MEDICINE

## 2020-06-04 PROCEDURE — 3008F BODY MASS INDEX DOCD: CPT | Mod: CPTII,S$GLB,, | Performed by: FAMILY MEDICINE

## 2020-06-04 PROCEDURE — 3075F PR MOST RECENT SYSTOLIC BLOOD PRESS GE 130-139MM HG: ICD-10-PCS | Mod: CPTII,S$GLB,, | Performed by: FAMILY MEDICINE

## 2020-06-04 NOTE — ASSESSMENT & PLAN NOTE
-cervical xray 1/2020 showed mild to moderate disc space narrowing. Persistent symptoms despite physical therapy

## 2020-06-04 NOTE — PROGRESS NOTES
Patient ID: Magalis Russell is a 34 y.o. female.    Chief Complaint: F/U: Back pain    Back Pain   This is a new problem. The current episode started more than 1 month ago. The problem occurs 2 to 4 times per day. The problem is unchanged. The pain is present in the gluteal and lumbar spine. The quality of the pain is described as burning, shooting and stabbing. The pain radiates to the left foot and left thigh. The pain is at a severity of 3/10. The pain is mild. The pain is the same all the time. The symptoms are aggravated by bending and position. Stiffness is present in the morning. Associated symptoms include leg pain, numbness, paresthesias, tingling and weakness. Pertinent negatives include no abdominal pain, bladder incontinence, bowel incontinence, chest pain, dysuria, fever, headaches, paresis, pelvic pain, perianal numbness or weight loss. She has tried analgesics and home exercises for the symptoms. The treatment provided mild relief.     Here for follow-up from recent tele visit. Neck pain for more than 3 months with associated upper extremity numbness and tingling. More bothersome than back pain at this time. Also reports burning pain in shoulders. Still has numbness and tingling in hands. L>R. Positional changes aggravate symptoms. No weakness in hands. Symptoms daily.    Had physical therapy for cervical spine disc space narrowing. Had 10 physical therapy sessions. Only mild relief therapy. Is concerned about persistent symptoms.    Family History   Problem Relation Age of Onset    Heart failure Mother     Aneurysm Father     Breast cancer Sister        Current Outpatient Medications:     fluticasone propionate (FLONASE) 50 mcg/actuation nasal spray, 1 spray by Each Nostril route once daily., Disp: , Rfl: 0    meclizine (ANTIVERT) 25 mg tablet, Take 1 tablet (25 mg total) by mouth daily as needed., Disp: 20 tablet, Rfl: 0    meloxicam (MOBIC) 7.5 MG tablet, Take 1 tablet (7.5 mg total) by mouth  "once daily., Disp: 30 tablet, Rfl: 0    metoprolol succinate (TOPROL-XL) 25 MG 24 hr tablet, , Disp: , Rfl:     norgestimate-ethinyl estradiol (SPRINTEC, 28,) 0.25-35 mg-mcg per tablet, Take 1 tablet by mouth once daily., Disp: 30 tablet, Rfl: 2    EPINEPHrine (EPIPEN) 0.3 mg/0.3 mL AtIn, Inject 0.3 mLs (0.3 mg total) into the muscle once. for 1 dose, Disp: 2 each, Rfl: 0    Review of Systems   Constitutional: Negative for fever and weight loss.   HENT:        Neck pain   Cardiovascular: Negative for chest pain.   Gastrointestinal: Negative for abdominal pain and bowel incontinence.   Genitourinary: Negative for bladder incontinence, dysuria, hematuria and pelvic pain.   Musculoskeletal: Positive for back pain.   Neurological: Positive for tingling, weakness, numbness and paresthesias. Negative for headaches.       Objective:   /83 (BP Location: Left arm, Patient Position: Sitting, BP Method: Large (Automatic))   Pulse 75   Temp 97 °F (36.1 °C)   Ht 5' 7" (1.702 m)   Wt 73.5 kg (162 lb 0.6 oz)   LMP 05/24/2020   SpO2 96%   BMI 25.38 kg/m²      Physical Exam   Constitutional: She is oriented to person, place, and time. She appears well-developed and well-nourished. No distress.   HENT:   Head: Normocephalic and atraumatic.   Eyes: Conjunctivae are normal.   Neck: Normal range of motion.   Cardiovascular: Normal rate, regular rhythm and normal heart sounds.   Pulmonary/Chest: Effort normal and breath sounds normal.   Abdominal: Soft.   Musculoskeletal: Normal range of motion.   Negative straight leg test, neck pain with left and right lateral flexion   Neurological: She is alert and oriented to person, place, and time. A sensory deficit (decreased sensation to light touch b/l arms and hands (palmar aspect), L>R)) is present.   Skin: Skin is warm. She is not diaphoretic.   Psychiatric: She has a normal mood and affect. Her behavior is normal.       Assessment & Plan     Problem List Items Addressed This " Visit        Orthopedic    Acute bilateral low back pain    Current Assessment & Plan     -relief with home stretching exercises.   -will continue to monitor for now            Other    Numbness and tingling - Primary    Current Assessment & Plan     -cervical xray 1/2020 showed mild to moderate disc space narrowing. Persistent symptoms despite physical therapy             Relevant Orders    MRI Cervical Spine Without Contrast      Other Visit Diagnoses     Neck pain        Relevant Orders    MRI Cervical Spine Without Contrast          Disclaimer:  This note may have been prepared using voice recognition software, without extensive proofreading. As such, there could be errors within the text such as sound alike errors.

## 2020-06-09 DIAGNOSIS — M54.2 CERVICALGIA: Primary | ICD-10-CM

## 2020-06-09 DIAGNOSIS — M54.50 ACUTE BILATERAL LOW BACK PAIN WITHOUT SCIATICA: ICD-10-CM

## 2020-06-09 RX ORDER — LIDOCAINE 50 MG/G
1 PATCH TOPICAL DAILY
Qty: 30 PATCH | Refills: 1 | Status: SHIPPED | OUTPATIENT
Start: 2020-06-09 | End: 2021-08-06 | Stop reason: SDUPTHER

## 2020-06-10 ENCOUNTER — HOSPITAL ENCOUNTER (OUTPATIENT)
Dept: RADIOLOGY | Facility: HOSPITAL | Age: 35
Discharge: HOME | End: 2020-06-10
Attending: FAMILY MEDICINE
Payer: COMMERCIAL

## 2020-06-10 ENCOUNTER — TELEPHONE (OUTPATIENT)
Dept: INTERNAL MEDICINE | Facility: CLINIC | Age: 35
End: 2020-06-10

## 2020-06-10 DIAGNOSIS — M54.2 NECK PAIN: ICD-10-CM

## 2020-06-10 DIAGNOSIS — M50.30 DDD (DEGENERATIVE DISC DISEASE), CERVICAL: Primary | ICD-10-CM

## 2020-06-10 DIAGNOSIS — R20.2 NUMBNESS AND TINGLING: ICD-10-CM

## 2020-06-10 DIAGNOSIS — R20.0 NUMBNESS AND TINGLING: ICD-10-CM

## 2020-06-10 PROCEDURE — 72141 MRI NECK SPINE W/O DYE: CPT | Mod: TC,PO

## 2020-06-10 PROCEDURE — 72141 MRI CERVICAL SPINE WITHOUT CONTRAST: ICD-10-PCS | Mod: 26,,, | Performed by: RADIOLOGY

## 2020-06-10 PROCEDURE — 72141 MRI NECK SPINE W/O DYE: CPT | Mod: 26,,, | Performed by: RADIOLOGY

## 2020-06-11 DIAGNOSIS — M51.36 LUMBAR DEGENERATIVE DISC DISEASE: Primary | ICD-10-CM

## 2020-06-11 RX ORDER — GABAPENTIN 300 MG/1
300 CAPSULE ORAL 2 TIMES DAILY
Qty: 60 CAPSULE | Refills: 3 | Status: SHIPPED | OUTPATIENT
Start: 2020-06-11 | End: 2020-06-23

## 2020-06-17 ENCOUNTER — PATIENT OUTREACH (OUTPATIENT)
Dept: ADMINISTRATIVE | Facility: OTHER | Age: 35
End: 2020-06-17

## 2020-06-23 ENCOUNTER — OFFICE VISIT (OUTPATIENT)
Dept: OBSTETRICS AND GYNECOLOGY | Facility: CLINIC | Age: 35
End: 2020-06-23
Payer: COMMERCIAL

## 2020-06-23 VITALS
HEIGHT: 67 IN | WEIGHT: 165.81 LBS | BODY MASS INDEX: 26.02 KG/M2 | SYSTOLIC BLOOD PRESSURE: 132 MMHG | DIASTOLIC BLOOD PRESSURE: 84 MMHG

## 2020-06-23 DIAGNOSIS — Z30.41 ENCOUNTER FOR SURVEILLANCE OF CONTRACEPTIVE PILLS: ICD-10-CM

## 2020-06-23 DIAGNOSIS — Z87.410 HISTORY OF CERVICAL DYSPLASIA: Primary | ICD-10-CM

## 2020-06-23 PROBLEM — R87.611 ATYPICAL SQUAMOUS CELLS CANNOT EXCLUDE HIGH GRADE SQUAMOUS INTRAEPITHELIAL LESION ON CYTOLOGIC SMEAR OF CERVIX (ASC-H): Status: RESOLVED | Noted: 2019-02-04 | Resolved: 2020-06-23

## 2020-06-23 PROCEDURE — 99999 PR PBB SHADOW E&M-EST. PATIENT-LVL III: ICD-10-PCS | Mod: PBBFAC,,, | Performed by: OBSTETRICS & GYNECOLOGY

## 2020-06-23 PROCEDURE — 99213 PR OFFICE/OUTPT VISIT, EST, LEVL III, 20-29 MIN: ICD-10-PCS | Mod: S$GLB,,, | Performed by: OBSTETRICS & GYNECOLOGY

## 2020-06-23 PROCEDURE — 88141 PR  CYTOPATH CERV/VAG INTERPRET: ICD-10-PCS | Mod: ,,, | Performed by: PATHOLOGY

## 2020-06-23 PROCEDURE — 3079F PR MOST RECENT DIASTOLIC BLOOD PRESSURE 80-89 MM HG: ICD-10-PCS | Mod: CPTII,S$GLB,, | Performed by: OBSTETRICS & GYNECOLOGY

## 2020-06-23 PROCEDURE — 99213 OFFICE O/P EST LOW 20 MIN: CPT | Mod: S$GLB,,, | Performed by: OBSTETRICS & GYNECOLOGY

## 2020-06-23 PROCEDURE — 3075F SYST BP GE 130 - 139MM HG: CPT | Mod: CPTII,S$GLB,, | Performed by: OBSTETRICS & GYNECOLOGY

## 2020-06-23 PROCEDURE — 3075F PR MOST RECENT SYSTOLIC BLOOD PRESS GE 130-139MM HG: ICD-10-PCS | Mod: CPTII,S$GLB,, | Performed by: OBSTETRICS & GYNECOLOGY

## 2020-06-23 PROCEDURE — 3079F DIAST BP 80-89 MM HG: CPT | Mod: CPTII,S$GLB,, | Performed by: OBSTETRICS & GYNECOLOGY

## 2020-06-23 PROCEDURE — 88175 CYTOPATH C/V AUTO FLUID REDO: CPT | Performed by: PATHOLOGY

## 2020-06-23 PROCEDURE — 99999 PR PBB SHADOW E&M-EST. PATIENT-LVL III: CPT | Mod: PBBFAC,,, | Performed by: OBSTETRICS & GYNECOLOGY

## 2020-06-23 PROCEDURE — 3008F PR BODY MASS INDEX (BMI) DOCUMENTED: ICD-10-PCS | Mod: CPTII,S$GLB,, | Performed by: OBSTETRICS & GYNECOLOGY

## 2020-06-23 PROCEDURE — 88141 CYTOPATH C/V INTERPRET: CPT | Mod: ,,, | Performed by: PATHOLOGY

## 2020-06-23 PROCEDURE — 3008F BODY MASS INDEX DOCD: CPT | Mod: CPTII,S$GLB,, | Performed by: OBSTETRICS & GYNECOLOGY

## 2020-06-23 RX ORDER — NORGESTIMATE AND ETHINYL ESTRADIOL 0.25-0.035
1 KIT ORAL DAILY
Qty: 30 TABLET | Refills: 11 | Status: SHIPPED | OUTPATIENT
Start: 2020-06-23 | End: 2021-06-22

## 2020-06-23 NOTE — PROGRESS NOTES
Subjective:       Patient ID: Magalis Russell is a 34 y.o. female.    Chief Complaint:  Abnormal Pap Smear (repeat pap smear)      History of Present Illness  HPI  Presents for repeat pap.  Also needs refill of OCP's.  Pt is considering permanent sterilization.   LEEP 3/1/19: anterior cervix: SINDY 1, neg margins  ---posterior cervix: SINDY 2-3 with positive margins  Pap and ECC 19: neg/neg  Was due for repeat pap in March, but that was delayed due to the pandemic    GYN & OB History  Patient's last menstrual period was 2020 (exact date).   Date of Last Pap: 2019    OB History    Para Term  AB Living   2 2 2     2   SAB TAB Ectopic Multiple Live Births           2      # Outcome Date GA Lbr Galindo/2nd Weight Sex Delivery Anes PTL Lv   2 Term 08 40w0d   F Vag-Spont   CHAVA   1 Term 02 40w0d   M Vag-Spont   CHAVA       Review of Systems  Review of Systems   Constitutional: Negative for fatigue, fever and unexpected weight change.   Gastrointestinal: Negative for abdominal pain, constipation, diarrhea, nausea and vomiting.   Genitourinary: Negative for dysuria, frequency, menstrual problem, pelvic pain, urgency, vaginal bleeding, vaginal discharge, vaginal pain, postcoital bleeding and vaginal odor.           Objective:    Physical Exam:   Constitutional: She is oriented to person, place, and time. She appears well-developed and well-nourished. No distress.             Abdominal: Soft. She exhibits no distension and no mass. There is no abdominal tenderness. There is no rebound and no guarding. Hernia confirmed negative in the right inguinal area and confirmed negative in the left inguinal area.     Genitourinary:    Vagina normal.      Pelvic exam was performed with patient supine.   There is no rash, tenderness, lesion or injury on the right labia. There is no rash, tenderness, lesion or injury on the left labia. Uterus is not deviated, not enlarged, not fixed, not tender and not  experiencing uterine prolapse. Right adnexum displays no mass, no tenderness and no fullness. Left adnexum displays no mass, no tenderness and no fullness. No erythema, tenderness, bleeding, rectocele, cystocele or unspecified prolapse of vaginal walls in the vagina.    No foreign body in the vagina.      No signs of injury in the vagina.   Cervix exhibits no motion tenderness, no discharge and no friability.        Uterus Size: 6 cm       Neurological: She is alert and oriented to person, place, and time.     Psychiatric: She has a normal mood and affect.          Assessment:        1. History of cervical dysplasia    2. Encounter for surveillance of contraceptive pills                Plan:      Magalis was seen today for abnormal pap smear.    Diagnoses and all orders for this visit:    History of cervical dysplasia  -     Liquid-Based Pap Smear, Screening    Encounter for surveillance of contraceptive pills  -     norgestimate-ethinyl estradioL (SPRINTEC, 28,) 0.25-35 mg-mcg per tablet; Take 1 tablet by mouth once daily.    If pap neg, okay for pap with HPV co-test in 12 months.  Discussed tubal ligation versus salpingectomy.  Will see what pap shows.  If pt with recurrent dysplasia, she may opt for hysterectomy instead.

## 2020-07-01 LAB
FINAL PATHOLOGIC DIAGNOSIS: NORMAL
Lab: NORMAL

## 2020-08-24 ENCOUNTER — LAB VISIT (OUTPATIENT)
Dept: LAB | Facility: HOSPITAL | Age: 35
End: 2020-08-24
Attending: FAMILY MEDICINE
Payer: COMMERCIAL

## 2020-08-24 ENCOUNTER — OFFICE VISIT (OUTPATIENT)
Dept: INTERNAL MEDICINE | Facility: CLINIC | Age: 35
End: 2020-08-24
Payer: COMMERCIAL

## 2020-08-24 VITALS
RESPIRATION RATE: 18 BRPM | OXYGEN SATURATION: 100 % | WEIGHT: 171.94 LBS | HEIGHT: 67 IN | DIASTOLIC BLOOD PRESSURE: 78 MMHG | HEART RATE: 72 BPM | TEMPERATURE: 98 F | SYSTOLIC BLOOD PRESSURE: 112 MMHG | BODY MASS INDEX: 26.99 KG/M2

## 2020-08-24 DIAGNOSIS — Z00.00 ROUTINE GENERAL MEDICAL EXAMINATION AT A HEALTH CARE FACILITY: Primary | ICD-10-CM

## 2020-08-24 DIAGNOSIS — Z00.00 ROUTINE GENERAL MEDICAL EXAMINATION AT A HEALTH CARE FACILITY: ICD-10-CM

## 2020-08-24 PROBLEM — R35.0 URINARY FREQUENCY: Status: RESOLVED | Noted: 2018-09-26 | Resolved: 2020-08-24

## 2020-08-24 PROBLEM — B37.31 CANDIDIASIS OF VULVA AND VAGINA: Status: RESOLVED | Noted: 2019-04-03 | Resolved: 2020-08-24

## 2020-08-24 LAB
ALBUMIN SERPL BCP-MCNC: 3.9 G/DL (ref 3.5–5.2)
ALP SERPL-CCNC: 61 U/L (ref 55–135)
ALT SERPL W/O P-5'-P-CCNC: 14 U/L (ref 10–44)
ANION GAP SERPL CALC-SCNC: 9 MMOL/L (ref 8–16)
AST SERPL-CCNC: 15 U/L (ref 10–40)
BASOPHILS # BLD AUTO: 0.05 K/UL (ref 0–0.2)
BASOPHILS NFR BLD: 0.6 % (ref 0–1.9)
BILIRUB SERPL-MCNC: 0.6 MG/DL (ref 0.1–1)
BUN SERPL-MCNC: 18 MG/DL (ref 6–20)
CALCIUM SERPL-MCNC: 9.8 MG/DL (ref 8.7–10.5)
CHLORIDE SERPL-SCNC: 106 MMOL/L (ref 95–110)
CO2 SERPL-SCNC: 26 MMOL/L (ref 23–29)
CREAT SERPL-MCNC: 0.9 MG/DL (ref 0.5–1.4)
DIFFERENTIAL METHOD: ABNORMAL
EOSINOPHIL # BLD AUTO: 0.3 K/UL (ref 0–0.5)
EOSINOPHIL NFR BLD: 3.9 % (ref 0–8)
ERYTHROCYTE [DISTWIDTH] IN BLOOD BY AUTOMATED COUNT: 12.7 % (ref 11.5–14.5)
EST. GFR  (AFRICAN AMERICAN): >60 ML/MIN/1.73 M^2
EST. GFR  (NON AFRICAN AMERICAN): >60 ML/MIN/1.73 M^2
GLUCOSE SERPL-MCNC: 98 MG/DL (ref 70–110)
HCT VFR BLD AUTO: 42.7 % (ref 37–48.5)
HGB BLD-MCNC: 13.7 G/DL (ref 12–16)
IMM GRANULOCYTES # BLD AUTO: 0.02 K/UL (ref 0–0.04)
IMM GRANULOCYTES NFR BLD AUTO: 0.2 % (ref 0–0.5)
LYMPHOCYTES # BLD AUTO: 3.1 K/UL (ref 1–4.8)
LYMPHOCYTES NFR BLD: 37.6 % (ref 18–48)
MCH RBC QN AUTO: 31.3 PG (ref 27–31)
MCHC RBC AUTO-ENTMCNC: 32.1 G/DL (ref 32–36)
MCV RBC AUTO: 98 FL (ref 82–98)
MONOCYTES # BLD AUTO: 0.5 K/UL (ref 0.3–1)
MONOCYTES NFR BLD: 5.7 % (ref 4–15)
NEUTROPHILS # BLD AUTO: 4.3 K/UL (ref 1.8–7.7)
NEUTROPHILS NFR BLD: 52 % (ref 38–73)
NRBC BLD-RTO: 0 /100 WBC
PLATELET # BLD AUTO: 297 K/UL (ref 150–350)
PMV BLD AUTO: 10.3 FL (ref 9.2–12.9)
POTASSIUM SERPL-SCNC: 4.5 MMOL/L (ref 3.5–5.1)
PROT SERPL-MCNC: 7.7 G/DL (ref 6–8.4)
RBC # BLD AUTO: 4.38 M/UL (ref 4–5.4)
SODIUM SERPL-SCNC: 141 MMOL/L (ref 136–145)
TSH SERPL DL<=0.005 MIU/L-ACNC: 1.11 UIU/ML (ref 0.4–4)
WBC # BLD AUTO: 8.27 K/UL (ref 3.9–12.7)

## 2020-08-24 PROCEDURE — 99999 PR PBB SHADOW E&M-EST. PATIENT-LVL IV: ICD-10-PCS | Mod: PBBFAC,,, | Performed by: FAMILY MEDICINE

## 2020-08-24 PROCEDURE — 3008F BODY MASS INDEX DOCD: CPT | Mod: CPTII,S$GLB,, | Performed by: FAMILY MEDICINE

## 2020-08-24 PROCEDURE — 80053 COMPREHEN METABOLIC PANEL: CPT | Mod: PO

## 2020-08-24 PROCEDURE — 3078F DIAST BP <80 MM HG: CPT | Mod: CPTII,S$GLB,, | Performed by: FAMILY MEDICINE

## 2020-08-24 PROCEDURE — 86803 HEPATITIS C AB TEST: CPT

## 2020-08-24 PROCEDURE — 3074F SYST BP LT 130 MM HG: CPT | Mod: CPTII,S$GLB,, | Performed by: FAMILY MEDICINE

## 2020-08-24 PROCEDURE — 3008F PR BODY MASS INDEX (BMI) DOCUMENTED: ICD-10-PCS | Mod: CPTII,S$GLB,, | Performed by: FAMILY MEDICINE

## 2020-08-24 PROCEDURE — 83036 HEMOGLOBIN GLYCOSYLATED A1C: CPT

## 2020-08-24 PROCEDURE — 3074F PR MOST RECENT SYSTOLIC BLOOD PRESSURE < 130 MM HG: ICD-10-PCS | Mod: CPTII,S$GLB,, | Performed by: FAMILY MEDICINE

## 2020-08-24 PROCEDURE — 3078F PR MOST RECENT DIASTOLIC BLOOD PRESSURE < 80 MM HG: ICD-10-PCS | Mod: CPTII,S$GLB,, | Performed by: FAMILY MEDICINE

## 2020-08-24 PROCEDURE — 36415 COLL VENOUS BLD VENIPUNCTURE: CPT | Mod: PO

## 2020-08-24 PROCEDURE — 80061 LIPID PANEL: CPT

## 2020-08-24 PROCEDURE — 86703 HIV-1/HIV-2 1 RESULT ANTBDY: CPT

## 2020-08-24 PROCEDURE — 99395 PR PREVENTIVE VISIT,EST,18-39: ICD-10-PCS | Mod: S$GLB,,, | Performed by: FAMILY MEDICINE

## 2020-08-24 PROCEDURE — 85025 COMPLETE CBC W/AUTO DIFF WBC: CPT | Mod: PO

## 2020-08-24 PROCEDURE — 84443 ASSAY THYROID STIM HORMONE: CPT | Mod: PO

## 2020-08-24 PROCEDURE — 99395 PREV VISIT EST AGE 18-39: CPT | Mod: S$GLB,,, | Performed by: FAMILY MEDICINE

## 2020-08-24 PROCEDURE — 99999 PR PBB SHADOW E&M-EST. PATIENT-LVL IV: CPT | Mod: PBBFAC,,, | Performed by: FAMILY MEDICINE

## 2020-08-24 NOTE — PROGRESS NOTES
Patient ID: Magalis Russell is a 34 y.o. female.    Chief Complaint: Yearly checkup and Right ear clogged    HPI Patient is a 34-year-old female here for annual exam.  Also has chronic sinus issues and reports right ear pressure.  Takes Flonase and also uses nasal rinses which provides relief of intermittent sinus pressure and symptoms.  Besides chronic sinus problems she has been feeling okay overall.  Her back and neck pain have not been bothersome recently. She has no other acute complaints today.      Family History   Problem Relation Age of Onset    Heart failure Mother     Aneurysm Father     Breast cancer Sister 45    Colon cancer Neg Hx     Ovarian cancer Neg Hx     Thrombosis Neg Hx        Current Outpatient Medications:     EPINEPHrine (EPIPEN) 0.3 mg/0.3 mL AtIn, Inject 0.3 mLs (0.3 mg total) into the muscle once. for 1 dose, Disp: 2 each, Rfl: 0    fluticasone propionate (FLONASE) 50 mcg/actuation nasal spray, 1 spray by Each Nostril route once daily., Disp: , Rfl: 0    lidocaine (LIDODERM) 5 %, Place 1 patch onto the skin once daily. Remove & Discard patch within 12 hours or as directed by MD, Disp: 30 patch, Rfl: 1    meclizine (ANTIVERT) 25 mg tablet, Take 1 tablet (25 mg total) by mouth daily as needed., Disp: 20 tablet, Rfl: 0    metoprolol succinate (TOPROL-XL) 25 MG 24 hr tablet, , Disp: , Rfl:     norgestimate-ethinyl estradioL (SPRINTEC, 28,) 0.25-35 mg-mcg per tablet, Take 1 tablet by mouth once daily., Disp: 30 tablet, Rfl: 11    Review of Systems   Constitutional: Negative for activity change and unexpected weight change.   HENT: Negative for hearing loss, rhinorrhea and trouble swallowing.    Eyes: Negative for discharge and visual disturbance.   Respiratory: Negative for chest tightness and wheezing.    Cardiovascular: Negative for chest pain and palpitations.   Gastrointestinal: Negative for blood in stool, constipation, diarrhea and vomiting.   Endocrine: Negative for polydipsia  "and polyuria.   Genitourinary: Negative for difficulty urinating, dysuria, hematuria and menstrual problem.   Musculoskeletal: Negative for arthralgias, joint swelling and neck pain.   Neurological: Negative for weakness and headaches.   Psychiatric/Behavioral: Negative for confusion and dysphoric mood.       Objective:   /78 (BP Location: Left arm, Patient Position: Sitting, BP Method: Large (Manual))   Pulse 72   Temp 97.9 °F (36.6 °C) (Temporal)   Resp 18   Ht 5' 7" (1.702 m)   Wt 78 kg (171 lb 15.3 oz)   LMP 08/17/2020   SpO2 100%   BMI 26.93 kg/m²      Physical Exam  Constitutional:       Appearance: She is well-developed.   HENT:      Head: Normocephalic and atraumatic.      Right Ear: Tympanic membrane, ear canal and external ear normal.      Left Ear: Tympanic membrane, ear canal and external ear normal.   Eyes:      Conjunctiva/sclera: Conjunctivae normal.   Neck:      Musculoskeletal: Normal range of motion.   Cardiovascular:      Rate and Rhythm: Normal rate and regular rhythm.      Heart sounds: Normal heart sounds.   Pulmonary:      Effort: Pulmonary effort is normal.      Breath sounds: Normal breath sounds.   Abdominal:      Palpations: Abdomen is soft.      Tenderness: There is no abdominal tenderness.   Musculoskeletal: Normal range of motion.         General: No tenderness.   Skin:     General: Skin is warm.   Neurological:      Mental Status: She is alert and oriented to person, place, and time.   Psychiatric:         Behavior: Behavior normal.         Assessment & Plan     Problem List Items Addressed This Visit     None      Visit Diagnoses     Routine general medical examination at a health care facility    -  Primary    Relevant Orders    Lipid Panel    TSH    Comprehensive metabolic panel    Hemoglobin A1C    CBC auto differential    HIV 1/2 Ag/Ab (4th Gen)    Hepatitis C Antibody    Tdap Vaccine        Non abnormalities on ear exam, continue flonase and nasal " rinses      Disclaimer:  This note may have been prepared using voice recognition software, without extensive proofreading. As such, there could be errors within the text such as sound alike errors.

## 2020-08-25 LAB
CHOLEST SERPL-MCNC: 164 MG/DL (ref 120–199)
CHOLEST/HDLC SERPL: 3.2 {RATIO} (ref 2–5)
ESTIMATED AVG GLUCOSE: 108 MG/DL (ref 68–131)
HBA1C MFR BLD HPLC: 5.4 % (ref 4–5.6)
HCV AB SERPL QL IA: NEGATIVE
HDLC SERPL-MCNC: 52 MG/DL (ref 40–75)
HDLC SERPL: 31.7 % (ref 20–50)
HIV 1+2 AB+HIV1 P24 AG SERPL QL IA: NEGATIVE
LDLC SERPL CALC-MCNC: 85.4 MG/DL (ref 63–159)
NONHDLC SERPL-MCNC: 112 MG/DL
TRIGL SERPL-MCNC: 133 MG/DL (ref 30–150)

## 2020-08-25 NOTE — PROGRESS NOTES
Please notify patient that labs were reviewed. Cholesterol, TSH and hba1c are all normal. No concerning findings that require further work-up

## 2020-09-16 ENCOUNTER — HOSPITAL ENCOUNTER (OUTPATIENT)
Dept: CARDIOLOGY | Facility: HOSPITAL | Age: 35
Discharge: HOME | End: 2020-09-16
Attending: FAMILY MEDICINE
Payer: COMMERCIAL

## 2020-09-16 ENCOUNTER — HOSPITAL ENCOUNTER (OUTPATIENT)
Dept: RADIOLOGY | Facility: HOSPITAL | Age: 35
Discharge: HOME | End: 2020-09-16
Attending: FAMILY MEDICINE
Payer: COMMERCIAL

## 2020-09-16 ENCOUNTER — OFFICE VISIT (OUTPATIENT)
Dept: INTERNAL MEDICINE | Facility: CLINIC | Age: 35
End: 2020-09-16
Payer: COMMERCIAL

## 2020-09-16 VITALS
WEIGHT: 173.75 LBS | BODY MASS INDEX: 27.27 KG/M2 | HEIGHT: 67 IN | TEMPERATURE: 98 F | SYSTOLIC BLOOD PRESSURE: 130 MMHG | RESPIRATION RATE: 18 BRPM | HEART RATE: 69 BPM | OXYGEN SATURATION: 99 % | DIASTOLIC BLOOD PRESSURE: 86 MMHG

## 2020-09-16 DIAGNOSIS — R07.89 CHEST DISCOMFORT: ICD-10-CM

## 2020-09-16 DIAGNOSIS — M54.2 CERVICALGIA: ICD-10-CM

## 2020-09-16 DIAGNOSIS — R07.89 CHEST DISCOMFORT: Primary | ICD-10-CM

## 2020-09-16 DIAGNOSIS — Z86.79 HISTORY OF SUPRAVENTRICULAR TACHYCARDIA: ICD-10-CM

## 2020-09-16 PROCEDURE — 99999 PR PBB SHADOW E&M-EST. PATIENT-LVL IV: CPT | Mod: PBBFAC,,, | Performed by: FAMILY MEDICINE

## 2020-09-16 PROCEDURE — 3008F PR BODY MASS INDEX (BMI) DOCUMENTED: ICD-10-PCS | Mod: CPTII,S$GLB,, | Performed by: FAMILY MEDICINE

## 2020-09-16 PROCEDURE — 3075F PR MOST RECENT SYSTOLIC BLOOD PRESS GE 130-139MM HG: ICD-10-PCS | Mod: CPTII,S$GLB,, | Performed by: FAMILY MEDICINE

## 2020-09-16 PROCEDURE — 71046 X-RAY EXAM CHEST 2 VIEWS: CPT | Mod: 26,,, | Performed by: RADIOLOGY

## 2020-09-16 PROCEDURE — 99999 PR PBB SHADOW E&M-EST. PATIENT-LVL IV: ICD-10-PCS | Mod: PBBFAC,,, | Performed by: FAMILY MEDICINE

## 2020-09-16 PROCEDURE — 93010 ELECTROCARDIOGRAM REPORT: CPT | Mod: ,,, | Performed by: INTERNAL MEDICINE

## 2020-09-16 PROCEDURE — 93005 ELECTROCARDIOGRAM TRACING: CPT | Mod: PO

## 2020-09-16 PROCEDURE — 99214 PR OFFICE/OUTPT VISIT, EST, LEVL IV, 30-39 MIN: ICD-10-PCS | Mod: S$GLB,,, | Performed by: FAMILY MEDICINE

## 2020-09-16 PROCEDURE — 3075F SYST BP GE 130 - 139MM HG: CPT | Mod: CPTII,S$GLB,, | Performed by: FAMILY MEDICINE

## 2020-09-16 PROCEDURE — 99214 OFFICE O/P EST MOD 30 MIN: CPT | Mod: S$GLB,,, | Performed by: FAMILY MEDICINE

## 2020-09-16 PROCEDURE — 3079F PR MOST RECENT DIASTOLIC BLOOD PRESSURE 80-89 MM HG: ICD-10-PCS | Mod: CPTII,S$GLB,, | Performed by: FAMILY MEDICINE

## 2020-09-16 PROCEDURE — 93010 EKG 12-LEAD: ICD-10-PCS | Mod: ,,, | Performed by: INTERNAL MEDICINE

## 2020-09-16 PROCEDURE — 71046 X-RAY EXAM CHEST 2 VIEWS: CPT | Mod: TC,PO

## 2020-09-16 PROCEDURE — 71046 XR CHEST PA AND LATERAL: ICD-10-PCS | Mod: 26,,, | Performed by: RADIOLOGY

## 2020-09-16 PROCEDURE — 3079F DIAST BP 80-89 MM HG: CPT | Mod: CPTII,S$GLB,, | Performed by: FAMILY MEDICINE

## 2020-09-16 PROCEDURE — 3008F BODY MASS INDEX DOCD: CPT | Mod: CPTII,S$GLB,, | Performed by: FAMILY MEDICINE

## 2020-09-16 NOTE — ASSESSMENT & PLAN NOTE
-follows with cardiologist Dr. Thurston. No further episodes since starting metoprolol  -has follow-up oct 6

## 2020-09-16 NOTE — ASSESSMENT & PLAN NOTE
-cxr normal. No concerning findings on ekg  -discussed with patient. ddx include msk, gerd etc. Can take antacid and/or tylenol prn

## 2020-09-16 NOTE — PROGRESS NOTES
Patient ID: Magalis Russell is a 35 y.o. female.    Chief Complaint: Chest discomfort    HPI Patient is 35-year-old female who presents with complaint of chest pain that began yesterday while sitting at work. Pain persisted when she arrived home and was performing chores at home.  She took an antacid that provided some relief and at bedtime pain resolved.  Woke up today with similar pain.  No current pain during this encounter.  Associated symptoms include urge to belch and acid reflux type burning in chest Says chest pain was located at the center of her chest then radiated under her right breast.  No associated diaphoresis, nausea, vomiting.     Follows with Dr. Thurston due to history of SVT.  Reports compliance with metoprolol.  Did contact cardiologist when symptoms began and has appt scheduled on October 6. Has had echo in the past which was normal. holter monitor showed SVT. No episodes since starting metoprolol.     Patient has cervical spine disc protrusion which causes some left upper extremity numbness and tingling.  Mild increase in symptoms since school began.      Family History   Problem Relation Age of Onset    Heart failure Mother     Aneurysm Father     Breast cancer Sister 45    Colon cancer Neg Hx     Ovarian cancer Neg Hx     Thrombosis Neg Hx        Current Outpatient Medications:     EPINEPHrine (EPIPEN) 0.3 mg/0.3 mL AtIn, Inject 0.3 mLs (0.3 mg total) into the muscle once. for 1 dose, Disp: 2 each, Rfl: 0    fluticasone propionate (FLONASE) 50 mcg/actuation nasal spray, 1 spray by Each Nostril route once daily., Disp: , Rfl: 0    lidocaine (LIDODERM) 5 %, Place 1 patch onto the skin once daily. Remove & Discard patch within 12 hours or as directed by MD, Disp: 30 patch, Rfl: 1    meclizine (ANTIVERT) 25 mg tablet, Take 1 tablet (25 mg total) by mouth daily as needed., Disp: 20 tablet, Rfl: 0    metoprolol succinate (TOPROL-XL) 25 MG 24 hr tablet, , Disp: , Rfl:      "norgestimate-ethinyl estradioL (SPRINTEC, 28,) 0.25-35 mg-mcg per tablet, Take 1 tablet by mouth once daily., Disp: 30 tablet, Rfl: 11    Review of Systems   Constitutional: Negative for activity change and unexpected weight change.   HENT: Negative for hearing loss, rhinorrhea and trouble swallowing.    Eyes: Negative for discharge and visual disturbance.   Respiratory: Negative for chest tightness and wheezing.    Cardiovascular: Negative for chest pain and palpitations.   Gastrointestinal: Negative for blood in stool, constipation, diarrhea and vomiting.   Endocrine: Negative for polydipsia and polyuria.   Genitourinary: Negative for difficulty urinating, dysuria, hematuria and menstrual problem.   Musculoskeletal: Negative for arthralgias, joint swelling and neck pain.   Neurological: Negative for weakness and headaches.   Psychiatric/Behavioral: Negative for confusion and dysphoric mood.       Objective:   /86 (BP Location: Left arm, Patient Position: Sitting, BP Method: Large (Manual))   Pulse 69   Temp 97.9 °F (36.6 °C) (Temporal)   Resp 18   Ht 5' 7" (1.702 m)   Wt 78.8 kg (173 lb 11.6 oz)   LMP 08/11/2020   SpO2 99%   BMI 27.21 kg/m²      Physical Exam  Constitutional:       General: She is not in acute distress.     Appearance: Normal appearance. She is well-developed. She is not ill-appearing, toxic-appearing or diaphoretic.   HENT:      Head: Normocephalic and atraumatic.   Neck:      Musculoskeletal: Normal range of motion.   Cardiovascular:      Rate and Rhythm: Normal rate and regular rhythm.      Heart sounds: Normal heart sounds. No murmur.      Comments: Mild right chest wall ttp  Pulmonary:      Effort: Pulmonary effort is normal.      Breath sounds: Normal breath sounds.   Abdominal:      Palpations: Abdomen is soft.      Tenderness: There is no abdominal tenderness.   Musculoskeletal: Normal range of motion.      Comments: Mild left posterior neck ttp   Skin:     General: Skin is " warm.   Neurological:      Mental Status: She is alert and oriented to person, place, and time.   Psychiatric:         Behavior: Behavior normal.         Assessment & Plan     Problem List Items Addressed This Visit        Cardiac/Vascular    History of supraventricular tachycardia    Current Assessment & Plan     -follows with cardiologist Dr. Thurston. No further episodes since starting metoprolol  -has follow-up oct 6            Orthopedic    Cervicalgia    Current Assessment & Plan     -2/2 cervical disc protrusion. Sx at baseline            Other    Chest discomfort - Primary    Current Assessment & Plan     -cxr normal. No concerning findings on ekg  -discussed with patient. ddx include msk, gerd etc. Can take antacid and/or tylenol prn         Relevant Orders    X-Ray Chest PA And Lateral (Completed)    EKG 12-lead            Follow up if symptoms worsen or fail to improve.      Disclaimer:  This note may have been prepared using voice recognition software, without extensive proofreading. As such, there could be errors within the text such as sound alike errors.

## 2021-06-22 ENCOUNTER — OFFICE VISIT (OUTPATIENT)
Dept: OBSTETRICS AND GYNECOLOGY | Facility: CLINIC | Age: 36
End: 2021-06-22
Payer: COMMERCIAL

## 2021-06-22 VITALS
BODY MASS INDEX: 27.23 KG/M2 | DIASTOLIC BLOOD PRESSURE: 88 MMHG | HEIGHT: 67 IN | WEIGHT: 173.5 LBS | SYSTOLIC BLOOD PRESSURE: 144 MMHG

## 2021-06-22 DIAGNOSIS — Z01.419 WELL WOMAN EXAM WITH ROUTINE GYNECOLOGICAL EXAM: Primary | ICD-10-CM

## 2021-06-22 DIAGNOSIS — Z87.410 HISTORY OF CERVICAL DYSPLASIA: ICD-10-CM

## 2021-06-22 PROCEDURE — 3008F PR BODY MASS INDEX (BMI) DOCUMENTED: ICD-10-PCS | Mod: CPTII,S$GLB,, | Performed by: OBSTETRICS & GYNECOLOGY

## 2021-06-22 PROCEDURE — 1126F PR PAIN SEVERITY QUANTIFIED, NO PAIN PRESENT: ICD-10-PCS | Mod: S$GLB,,, | Performed by: OBSTETRICS & GYNECOLOGY

## 2021-06-22 PROCEDURE — 87624 HPV HI-RISK TYP POOLED RSLT: CPT | Performed by: OBSTETRICS & GYNECOLOGY

## 2021-06-22 PROCEDURE — 3008F BODY MASS INDEX DOCD: CPT | Mod: CPTII,S$GLB,, | Performed by: OBSTETRICS & GYNECOLOGY

## 2021-06-22 PROCEDURE — 99395 PR PREVENTIVE VISIT,EST,18-39: ICD-10-PCS | Mod: S$GLB,,, | Performed by: OBSTETRICS & GYNECOLOGY

## 2021-06-22 PROCEDURE — 99999 PR PBB SHADOW E&M-EST. PATIENT-LVL III: CPT | Mod: PBBFAC,,, | Performed by: OBSTETRICS & GYNECOLOGY

## 2021-06-22 PROCEDURE — 1126F AMNT PAIN NOTED NONE PRSNT: CPT | Mod: S$GLB,,, | Performed by: OBSTETRICS & GYNECOLOGY

## 2021-06-22 PROCEDURE — 99999 PR PBB SHADOW E&M-EST. PATIENT-LVL III: ICD-10-PCS | Mod: PBBFAC,,, | Performed by: OBSTETRICS & GYNECOLOGY

## 2021-06-22 PROCEDURE — 99395 PREV VISIT EST AGE 18-39: CPT | Mod: S$GLB,,, | Performed by: OBSTETRICS & GYNECOLOGY

## 2021-06-22 PROCEDURE — 88175 CYTOPATH C/V AUTO FLUID REDO: CPT | Performed by: OBSTETRICS & GYNECOLOGY

## 2021-06-22 RX ORDER — IBUPROFEN 800 MG/1
TABLET ORAL
COMMUNITY
Start: 2021-06-04 | End: 2021-08-06 | Stop reason: SDUPTHER

## 2021-06-22 RX ORDER — PREDNISONE 10 MG/1
TABLET ORAL
COMMUNITY
Start: 2021-06-04 | End: 2021-07-20 | Stop reason: ALTCHOICE

## 2021-06-22 RX ORDER — MONTELUKAST SODIUM 10 MG/1
10 TABLET ORAL NIGHTLY PRN
COMMUNITY
Start: 2021-06-01

## 2021-06-22 RX ORDER — CLOTRIMAZOLE AND BETAMETHASONE DIPROPIONATE 10; .64 MG/G; MG/G
CREAM TOPICAL 2 TIMES DAILY
COMMUNITY
Start: 2021-03-22 | End: 2021-08-06

## 2021-06-22 RX ORDER — HYDROCORTISONE 25 MG/G
CREAM TOPICAL
COMMUNITY
Start: 2021-02-18 | End: 2023-04-21 | Stop reason: CLARIF

## 2021-06-22 RX ORDER — LORATADINE 10 MG/1
10 TABLET ORAL DAILY
COMMUNITY
Start: 2021-06-04 | End: 2021-08-06 | Stop reason: SDUPTHER

## 2021-06-22 RX ORDER — TRAMADOL HYDROCHLORIDE 50 MG/1
50 TABLET ORAL 3 TIMES DAILY PRN
COMMUNITY
Start: 2021-01-04 | End: 2022-08-01 | Stop reason: ALTCHOICE

## 2021-06-28 LAB
CLINICAL INFO: NORMAL
CYTO CVX: NORMAL
CYTOLOGIST CVX/VAG CYTO: NORMAL
CYTOLOGIST CVX/VAG CYTO: NORMAL
CYTOLOGY CMNT CVX/VAG CYTO-IMP: NORMAL
CYTOLOGY PAP THIN PREP EXPLANATION: NORMAL
DATE OF PREVIOUS PAP: NORMAL
DATE PREVIOUS BX: YES
HPV I/H RISK 4 DNA CVX QL NAA+PROBE: NOT DETECTED
LMP START DATE: NORMAL
SPECIMEN SOURCE CVX/VAG CYTO: NORMAL
STAT OF ADQ CVX/VAG CYTO-IMP: NORMAL

## 2021-07-20 ENCOUNTER — OFFICE VISIT (OUTPATIENT)
Dept: INTERNAL MEDICINE | Facility: CLINIC | Age: 36
End: 2021-07-20
Payer: COMMERCIAL

## 2021-07-20 DIAGNOSIS — Z86.79 HISTORY OF SUPRAVENTRICULAR TACHYCARDIA: ICD-10-CM

## 2021-07-20 DIAGNOSIS — Z71.89 ADVICE GIVEN ABOUT COVID-19 VIRUS INFECTION: Primary | ICD-10-CM

## 2021-07-20 PROCEDURE — 99212 PR OFFICE/OUTPT VISIT, EST, LEVL II, 10-19 MIN: ICD-10-PCS | Mod: 95,,, | Performed by: NURSE PRACTITIONER

## 2021-07-20 PROCEDURE — 99212 OFFICE O/P EST SF 10 MIN: CPT | Mod: 95,,, | Performed by: NURSE PRACTITIONER

## 2021-08-06 ENCOUNTER — LAB VISIT (OUTPATIENT)
Dept: LAB | Facility: HOSPITAL | Age: 36
End: 2021-08-06
Attending: NURSE PRACTITIONER
Payer: COMMERCIAL

## 2021-08-06 ENCOUNTER — OFFICE VISIT (OUTPATIENT)
Dept: INTERNAL MEDICINE | Facility: CLINIC | Age: 36
End: 2021-08-06
Payer: COMMERCIAL

## 2021-08-06 VITALS
OXYGEN SATURATION: 97 % | HEIGHT: 67 IN | BODY MASS INDEX: 27.2 KG/M2 | SYSTOLIC BLOOD PRESSURE: 109 MMHG | HEART RATE: 106 BPM | DIASTOLIC BLOOD PRESSURE: 70 MMHG | WEIGHT: 173.31 LBS | TEMPERATURE: 97 F

## 2021-08-06 DIAGNOSIS — Z13.29 THYROID DISORDER SCREENING: ICD-10-CM

## 2021-08-06 DIAGNOSIS — M54.2 CERVICALGIA: ICD-10-CM

## 2021-08-06 DIAGNOSIS — J30.89 NON-SEASONAL ALLERGIC RHINITIS, UNSPECIFIED TRIGGER: ICD-10-CM

## 2021-08-06 DIAGNOSIS — Z00.00 ROUTINE ADULT HEALTH MAINTENANCE: ICD-10-CM

## 2021-08-06 DIAGNOSIS — Z86.79 HISTORY OF SUPRAVENTRICULAR TACHYCARDIA: ICD-10-CM

## 2021-08-06 DIAGNOSIS — Z00.00 ROUTINE ADULT HEALTH MAINTENANCE: Primary | ICD-10-CM

## 2021-08-06 PROBLEM — Z30.09 COUNSELING FOR BIRTH CONTROL REGARDING INTRAUTERINE DEVICE (IUD): Status: RESOLVED | Noted: 2019-01-17 | Resolved: 2021-08-06

## 2021-08-06 PROBLEM — M54.50 ACUTE BILATERAL LOW BACK PAIN: Status: RESOLVED | Noted: 2020-05-26 | Resolved: 2021-08-06

## 2021-08-06 PROBLEM — R07.89 CHEST DISCOMFORT: Status: RESOLVED | Noted: 2020-09-16 | Resolved: 2021-08-06

## 2021-08-06 LAB
ALBUMIN SERPL BCP-MCNC: 3.6 G/DL (ref 3.5–5.2)
ALP SERPL-CCNC: 62 U/L (ref 55–135)
ALT SERPL W/O P-5'-P-CCNC: 15 U/L (ref 10–44)
ANION GAP SERPL CALC-SCNC: 9 MMOL/L (ref 8–16)
AST SERPL-CCNC: 14 U/L (ref 10–40)
BASOPHILS # BLD AUTO: 0.03 K/UL (ref 0–0.2)
BASOPHILS NFR BLD: 0.5 % (ref 0–1.9)
BILIRUB SERPL-MCNC: 0.5 MG/DL (ref 0.1–1)
BUN SERPL-MCNC: 11 MG/DL (ref 6–20)
CALCIUM SERPL-MCNC: 8.9 MG/DL (ref 8.7–10.5)
CHLORIDE SERPL-SCNC: 107 MMOL/L (ref 95–110)
CHOLEST SERPL-MCNC: 115 MG/DL (ref 120–199)
CHOLEST/HDLC SERPL: 2.9 {RATIO} (ref 2–5)
CO2 SERPL-SCNC: 24 MMOL/L (ref 23–29)
CREAT SERPL-MCNC: 0.8 MG/DL (ref 0.5–1.4)
DIFFERENTIAL METHOD: ABNORMAL
EOSINOPHIL # BLD AUTO: 0.5 K/UL (ref 0–0.5)
EOSINOPHIL NFR BLD: 7.8 % (ref 0–8)
ERYTHROCYTE [DISTWIDTH] IN BLOOD BY AUTOMATED COUNT: 13.3 % (ref 11.5–14.5)
EST. GFR  (AFRICAN AMERICAN): >60 ML/MIN/1.73 M^2
EST. GFR  (NON AFRICAN AMERICAN): >60 ML/MIN/1.73 M^2
GLUCOSE SERPL-MCNC: 103 MG/DL (ref 70–110)
HCT VFR BLD AUTO: 37.7 % (ref 37–48.5)
HDLC SERPL-MCNC: 39 MG/DL (ref 40–75)
HDLC SERPL: 33.9 % (ref 20–50)
HGB BLD-MCNC: 12.2 G/DL (ref 12–16)
IMM GRANULOCYTES # BLD AUTO: 0.02 K/UL (ref 0–0.04)
IMM GRANULOCYTES NFR BLD AUTO: 0.3 % (ref 0–0.5)
LDLC SERPL CALC-MCNC: 49.8 MG/DL (ref 63–159)
LYMPHOCYTES # BLD AUTO: 1.9 K/UL (ref 1–4.8)
LYMPHOCYTES NFR BLD: 29.6 % (ref 18–48)
MCH RBC QN AUTO: 30.6 PG (ref 27–31)
MCHC RBC AUTO-ENTMCNC: 32.4 G/DL (ref 32–36)
MCV RBC AUTO: 95 FL (ref 82–98)
MONOCYTES # BLD AUTO: 0.4 K/UL (ref 0.3–1)
MONOCYTES NFR BLD: 5.9 % (ref 4–15)
NEUTROPHILS # BLD AUTO: 3.6 K/UL (ref 1.8–7.7)
NEUTROPHILS NFR BLD: 55.9 % (ref 38–73)
NONHDLC SERPL-MCNC: 76 MG/DL
NRBC BLD-RTO: 0 /100 WBC
PLATELET # BLD AUTO: 272 K/UL (ref 150–450)
PMV BLD AUTO: 10.4 FL (ref 9.2–12.9)
POTASSIUM SERPL-SCNC: 4.1 MMOL/L (ref 3.5–5.1)
PROT SERPL-MCNC: 7.1 G/DL (ref 6–8.4)
RBC # BLD AUTO: 3.99 M/UL (ref 4–5.4)
SODIUM SERPL-SCNC: 140 MMOL/L (ref 136–145)
TRIGL SERPL-MCNC: 131 MG/DL (ref 30–150)
TSH SERPL DL<=0.005 MIU/L-ACNC: 0.76 UIU/ML (ref 0.4–4)
WBC # BLD AUTO: 6.41 K/UL (ref 3.9–12.7)

## 2021-08-06 PROCEDURE — 1160F PR REVIEW ALL MEDS BY PRESCRIBER/CLIN PHARMACIST DOCUMENTED: ICD-10-PCS | Mod: CPTII,S$GLB,, | Performed by: NURSE PRACTITIONER

## 2021-08-06 PROCEDURE — 1125F AMNT PAIN NOTED PAIN PRSNT: CPT | Mod: CPTII,S$GLB,, | Performed by: NURSE PRACTITIONER

## 2021-08-06 PROCEDURE — 1160F RVW MEDS BY RX/DR IN RCRD: CPT | Mod: CPTII,S$GLB,, | Performed by: NURSE PRACTITIONER

## 2021-08-06 PROCEDURE — 3074F PR MOST RECENT SYSTOLIC BLOOD PRESSURE < 130 MM HG: ICD-10-PCS | Mod: CPTII,S$GLB,, | Performed by: NURSE PRACTITIONER

## 2021-08-06 PROCEDURE — 1159F MED LIST DOCD IN RCRD: CPT | Mod: CPTII,S$GLB,, | Performed by: NURSE PRACTITIONER

## 2021-08-06 PROCEDURE — 99999 PR PBB SHADOW E&M-EST. PATIENT-LVL IV: ICD-10-PCS | Mod: PBBFAC,,, | Performed by: NURSE PRACTITIONER

## 2021-08-06 PROCEDURE — 3078F PR MOST RECENT DIASTOLIC BLOOD PRESSURE < 80 MM HG: ICD-10-PCS | Mod: CPTII,S$GLB,, | Performed by: NURSE PRACTITIONER

## 2021-08-06 PROCEDURE — 99999 PR PBB SHADOW E&M-EST. PATIENT-LVL IV: CPT | Mod: PBBFAC,,, | Performed by: NURSE PRACTITIONER

## 2021-08-06 PROCEDURE — 1125F PR PAIN SEVERITY QUANTIFIED, PAIN PRESENT: ICD-10-PCS | Mod: CPTII,S$GLB,, | Performed by: NURSE PRACTITIONER

## 2021-08-06 PROCEDURE — 3008F BODY MASS INDEX DOCD: CPT | Mod: CPTII,S$GLB,, | Performed by: NURSE PRACTITIONER

## 2021-08-06 PROCEDURE — 3078F DIAST BP <80 MM HG: CPT | Mod: CPTII,S$GLB,, | Performed by: NURSE PRACTITIONER

## 2021-08-06 PROCEDURE — 3008F PR BODY MASS INDEX (BMI) DOCUMENTED: ICD-10-PCS | Mod: CPTII,S$GLB,, | Performed by: NURSE PRACTITIONER

## 2021-08-06 PROCEDURE — 80053 COMPREHEN METABOLIC PANEL: CPT | Mod: PO | Performed by: NURSE PRACTITIONER

## 2021-08-06 PROCEDURE — 1159F PR MEDICATION LIST DOCUMENTED IN MEDICAL RECORD: ICD-10-PCS | Mod: CPTII,S$GLB,, | Performed by: NURSE PRACTITIONER

## 2021-08-06 PROCEDURE — 3074F SYST BP LT 130 MM HG: CPT | Mod: CPTII,S$GLB,, | Performed by: NURSE PRACTITIONER

## 2021-08-06 PROCEDURE — 99395 PREV VISIT EST AGE 18-39: CPT | Mod: S$GLB,,, | Performed by: NURSE PRACTITIONER

## 2021-08-06 PROCEDURE — 80061 LIPID PANEL: CPT | Performed by: NURSE PRACTITIONER

## 2021-08-06 PROCEDURE — 36415 COLL VENOUS BLD VENIPUNCTURE: CPT | Mod: PO | Performed by: NURSE PRACTITIONER

## 2021-08-06 PROCEDURE — 84443 ASSAY THYROID STIM HORMONE: CPT | Mod: PO | Performed by: NURSE PRACTITIONER

## 2021-08-06 PROCEDURE — 85025 COMPLETE CBC W/AUTO DIFF WBC: CPT | Mod: PO | Performed by: NURSE PRACTITIONER

## 2021-08-06 PROCEDURE — 99395 PR PREVENTIVE VISIT,EST,18-39: ICD-10-PCS | Mod: S$GLB,,, | Performed by: NURSE PRACTITIONER

## 2021-08-06 RX ORDER — LORATADINE 10 MG/1
10 TABLET ORAL DAILY
Qty: 90 TABLET | Refills: 0 | Status: SHIPPED | OUTPATIENT
Start: 2021-08-06 | End: 2022-12-05

## 2021-08-06 RX ORDER — LIDOCAINE 50 MG/G
1 PATCH TOPICAL DAILY
Qty: 30 PATCH | Refills: 1 | Status: SHIPPED | OUTPATIENT
Start: 2021-08-06 | End: 2023-04-21 | Stop reason: CLARIF

## 2021-08-06 RX ORDER — CLOTRIMAZOLE AND BETAMETHASONE DIPROPIONATE 10; .64 MG/G; MG/G
CREAM TOPICAL 2 TIMES DAILY
Status: CANCELLED | OUTPATIENT
Start: 2021-08-06

## 2021-08-06 RX ORDER — IBUPROFEN 800 MG/1
800 TABLET ORAL 3 TIMES DAILY PRN
Qty: 30 TABLET | Refills: 0 | Status: SHIPPED | OUTPATIENT
Start: 2021-08-06 | End: 2023-04-21 | Stop reason: CLARIF

## 2021-08-08 PROBLEM — Z71.89 ADVICE GIVEN ABOUT COVID-19 VIRUS INFECTION: Status: RESOLVED | Noted: 2021-07-20 | Resolved: 2021-08-08

## 2021-08-20 ENCOUNTER — PATIENT MESSAGE (OUTPATIENT)
Dept: INTERNAL MEDICINE | Facility: CLINIC | Age: 36
End: 2021-08-20

## 2021-08-31 ENCOUNTER — OFFICE VISIT (OUTPATIENT)
Dept: PAIN MEDICINE | Facility: CLINIC | Age: 36
End: 2021-08-31
Payer: COMMERCIAL

## 2021-08-31 ENCOUNTER — PATIENT MESSAGE (OUTPATIENT)
Dept: PAIN MEDICINE | Facility: CLINIC | Age: 36
End: 2021-08-31

## 2021-08-31 VITALS — BODY MASS INDEX: 27.15 KG/M2 | RESPIRATION RATE: 17 BRPM | WEIGHT: 173 LBS | HEIGHT: 67 IN

## 2021-08-31 DIAGNOSIS — M46.1 SACROILIITIS: ICD-10-CM

## 2021-08-31 DIAGNOSIS — M54.12 LEFT CERVICAL RADICULOPATHY: ICD-10-CM

## 2021-08-31 DIAGNOSIS — M50.30 DDD (DEGENERATIVE DISC DISEASE), CERVICAL: ICD-10-CM

## 2021-08-31 DIAGNOSIS — M47.816 LUMBAR SPONDYLOSIS: Primary | ICD-10-CM

## 2021-08-31 DIAGNOSIS — M54.16 LEFT LUMBAR RADICULOPATHY: Primary | ICD-10-CM

## 2021-08-31 PROCEDURE — 3008F BODY MASS INDEX DOCD: CPT | Mod: CPTII,,, | Performed by: PHYSICIAN ASSISTANT

## 2021-08-31 PROCEDURE — 1160F RVW MEDS BY RX/DR IN RCRD: CPT | Mod: CPTII,,, | Performed by: PHYSICIAN ASSISTANT

## 2021-08-31 PROCEDURE — 99204 PR OFFICE/OUTPT VISIT, NEW, LEVL IV, 45-59 MIN: ICD-10-PCS | Mod: 95,,, | Performed by: PHYSICIAN ASSISTANT

## 2021-08-31 PROCEDURE — 1159F MED LIST DOCD IN RCRD: CPT | Mod: CPTII,,, | Performed by: PHYSICIAN ASSISTANT

## 2021-08-31 PROCEDURE — 1160F PR REVIEW ALL MEDS BY PRESCRIBER/CLIN PHARMACIST DOCUMENTED: ICD-10-PCS | Mod: CPTII,,, | Performed by: PHYSICIAN ASSISTANT

## 2021-08-31 PROCEDURE — 1159F PR MEDICATION LIST DOCUMENTED IN MEDICAL RECORD: ICD-10-PCS | Mod: CPTII,,, | Performed by: PHYSICIAN ASSISTANT

## 2021-08-31 PROCEDURE — 3008F PR BODY MASS INDEX (BMI) DOCUMENTED: ICD-10-PCS | Mod: CPTII,,, | Performed by: PHYSICIAN ASSISTANT

## 2021-08-31 PROCEDURE — 99204 OFFICE O/P NEW MOD 45 MIN: CPT | Mod: 95,,, | Performed by: PHYSICIAN ASSISTANT

## 2021-08-31 RX ORDER — METHYLPREDNISOLONE 4 MG/1
TABLET ORAL
Qty: 1 PACKAGE | Refills: 0 | Status: SHIPPED | OUTPATIENT
Start: 2021-08-31 | End: 2021-09-01 | Stop reason: SDUPTHER

## 2021-08-31 RX ORDER — PREGABALIN 75 MG/1
75 CAPSULE ORAL 2 TIMES DAILY
Qty: 60 CAPSULE | Refills: 0 | Status: SHIPPED | OUTPATIENT
Start: 2021-08-31 | End: 2021-09-01 | Stop reason: SDUPTHER

## 2021-09-01 ENCOUNTER — PATIENT MESSAGE (OUTPATIENT)
Dept: PAIN MEDICINE | Facility: CLINIC | Age: 36
End: 2021-09-01

## 2021-09-01 ENCOUNTER — HOSPITAL ENCOUNTER (OUTPATIENT)
Dept: RADIOLOGY | Facility: HOSPITAL | Age: 36
Discharge: HOME | End: 2021-09-01
Attending: PHYSICIAN ASSISTANT
Payer: COMMERCIAL

## 2021-09-01 DIAGNOSIS — M54.12 LEFT CERVICAL RADICULOPATHY: ICD-10-CM

## 2021-09-01 DIAGNOSIS — M47.816 LUMBAR SPONDYLOSIS: ICD-10-CM

## 2021-09-01 DIAGNOSIS — M54.16 LEFT LUMBAR RADICULOPATHY: ICD-10-CM

## 2021-09-01 PROCEDURE — 72110 XR LUMBAR SPINE 5 VIEW WITH FLEX AND EXT: ICD-10-PCS | Mod: 26,,, | Performed by: RADIOLOGY

## 2021-09-01 PROCEDURE — 72110 X-RAY EXAM L-2 SPINE 4/>VWS: CPT | Mod: TC,PO

## 2021-09-01 PROCEDURE — 72110 X-RAY EXAM L-2 SPINE 4/>VWS: CPT | Mod: 26,,, | Performed by: RADIOLOGY

## 2021-09-01 RX ORDER — PREGABALIN 75 MG/1
75 CAPSULE ORAL 2 TIMES DAILY
Qty: 60 CAPSULE | Refills: 0 | Status: SHIPPED | OUTPATIENT
Start: 2021-09-01 | End: 2022-08-01 | Stop reason: ALTCHOICE

## 2021-09-01 RX ORDER — METHYLPREDNISOLONE 4 MG/1
TABLET ORAL
Qty: 1 PACKAGE | Refills: 0 | Status: SHIPPED | OUTPATIENT
Start: 2021-09-01 | End: 2022-08-01

## 2021-09-30 ENCOUNTER — TELEPHONE (OUTPATIENT)
Dept: PAIN MEDICINE | Facility: CLINIC | Age: 36
End: 2021-09-30

## 2021-11-08 PROBLEM — Z00.00 ROUTINE ADULT HEALTH MAINTENANCE: Status: RESOLVED | Noted: 2018-09-26 | Resolved: 2021-11-08

## 2021-12-10 DIAGNOSIS — Z91.018 MULTIPLE FOOD ALLERGIES: ICD-10-CM

## 2021-12-10 RX ORDER — EPINEPHRINE 0.3 MG/.3ML
1 INJECTION SUBCUTANEOUS ONCE
Qty: 2 EACH | Refills: 0 | Status: SHIPPED | OUTPATIENT
Start: 2021-12-10 | End: 2022-08-01

## 2022-01-03 ENCOUNTER — PATIENT MESSAGE (OUTPATIENT)
Dept: ADMINISTRATIVE | Facility: OTHER | Age: 37
End: 2022-01-03
Payer: COMMERCIAL

## 2022-07-19 ENCOUNTER — PATIENT MESSAGE (OUTPATIENT)
Dept: FAMILY MEDICINE | Facility: CLINIC | Age: 37
End: 2022-07-19
Payer: COMMERCIAL

## 2022-08-01 ENCOUNTER — LAB VISIT (OUTPATIENT)
Dept: LAB | Facility: HOSPITAL | Age: 37
End: 2022-08-01
Attending: NURSE PRACTITIONER
Payer: COMMERCIAL

## 2022-08-01 ENCOUNTER — OFFICE VISIT (OUTPATIENT)
Dept: INTERNAL MEDICINE | Facility: CLINIC | Age: 37
End: 2022-08-01
Payer: COMMERCIAL

## 2022-08-01 VITALS
RESPIRATION RATE: 18 BRPM | DIASTOLIC BLOOD PRESSURE: 70 MMHG | OXYGEN SATURATION: 96 % | HEART RATE: 73 BPM | HEIGHT: 67 IN | BODY MASS INDEX: 28.58 KG/M2 | TEMPERATURE: 98 F | WEIGHT: 182.13 LBS | SYSTOLIC BLOOD PRESSURE: 112 MMHG

## 2022-08-01 DIAGNOSIS — Z00.00 ROUTINE ADULT HEALTH MAINTENANCE: ICD-10-CM

## 2022-08-01 DIAGNOSIS — M54.2 CERVICALGIA: ICD-10-CM

## 2022-08-01 DIAGNOSIS — Z86.79 HISTORY OF SUPRAVENTRICULAR TACHYCARDIA: ICD-10-CM

## 2022-08-01 DIAGNOSIS — Z00.00 ROUTINE ADULT HEALTH MAINTENANCE: Primary | ICD-10-CM

## 2022-08-01 LAB
ALBUMIN SERPL BCP-MCNC: 3.4 G/DL (ref 3.5–5.2)
ALP SERPL-CCNC: 54 U/L (ref 55–135)
ALT SERPL W/O P-5'-P-CCNC: 10 U/L (ref 10–44)
ANION GAP SERPL CALC-SCNC: 7 MMOL/L (ref 8–16)
AST SERPL-CCNC: 11 U/L (ref 10–40)
BASOPHILS # BLD AUTO: 0.05 K/UL (ref 0–0.2)
BASOPHILS NFR BLD: 0.7 % (ref 0–1.9)
BILIRUB SERPL-MCNC: 0.5 MG/DL (ref 0.1–1)
BUN SERPL-MCNC: 12 MG/DL (ref 6–20)
CALCIUM SERPL-MCNC: 9 MG/DL (ref 8.7–10.5)
CHLORIDE SERPL-SCNC: 107 MMOL/L (ref 95–110)
CHOLEST SERPL-MCNC: 118 MG/DL (ref 120–199)
CHOLEST/HDLC SERPL: 3 {RATIO} (ref 2–5)
CO2 SERPL-SCNC: 24 MMOL/L (ref 23–29)
CREAT SERPL-MCNC: 0.8 MG/DL (ref 0.5–1.4)
DIFFERENTIAL METHOD: ABNORMAL
EOSINOPHIL # BLD AUTO: 0.3 K/UL (ref 0–0.5)
EOSINOPHIL NFR BLD: 3.7 % (ref 0–8)
ERYTHROCYTE [DISTWIDTH] IN BLOOD BY AUTOMATED COUNT: 13.3 % (ref 11.5–14.5)
EST. GFR  (NO RACE VARIABLE): >60 ML/MIN/1.73 M^2
GLUCOSE SERPL-MCNC: 100 MG/DL (ref 70–110)
HCT VFR BLD AUTO: 37.7 % (ref 37–48.5)
HDLC SERPL-MCNC: 40 MG/DL (ref 40–75)
HDLC SERPL: 33.9 % (ref 20–50)
HGB BLD-MCNC: 12.3 G/DL (ref 12–16)
IMM GRANULOCYTES # BLD AUTO: 0.01 K/UL (ref 0–0.04)
IMM GRANULOCYTES NFR BLD AUTO: 0.1 % (ref 0–0.5)
LDLC SERPL CALC-MCNC: 51 MG/DL (ref 63–159)
LYMPHOCYTES # BLD AUTO: 2.2 K/UL (ref 1–4.8)
LYMPHOCYTES NFR BLD: 29.5 % (ref 18–48)
MCH RBC QN AUTO: 31.2 PG (ref 27–31)
MCHC RBC AUTO-ENTMCNC: 32.6 G/DL (ref 32–36)
MCV RBC AUTO: 96 FL (ref 82–98)
MONOCYTES # BLD AUTO: 0.5 K/UL (ref 0.3–1)
MONOCYTES NFR BLD: 6.7 % (ref 4–15)
NEUTROPHILS # BLD AUTO: 4.5 K/UL (ref 1.8–7.7)
NEUTROPHILS NFR BLD: 59.3 % (ref 38–73)
NONHDLC SERPL-MCNC: 78 MG/DL
NRBC BLD-RTO: 0 /100 WBC
PLATELET # BLD AUTO: 262 K/UL (ref 150–450)
PMV BLD AUTO: 10 FL (ref 9.2–12.9)
POTASSIUM SERPL-SCNC: 3.9 MMOL/L (ref 3.5–5.1)
PROT SERPL-MCNC: 6.9 G/DL (ref 6–8.4)
RBC # BLD AUTO: 3.94 M/UL (ref 4–5.4)
SODIUM SERPL-SCNC: 138 MMOL/L (ref 136–145)
TRIGL SERPL-MCNC: 135 MG/DL (ref 30–150)
TSH SERPL DL<=0.005 MIU/L-ACNC: 1.2 UIU/ML (ref 0.4–4)
WBC # BLD AUTO: 7.59 K/UL (ref 3.9–12.7)

## 2022-08-01 PROCEDURE — 1159F PR MEDICATION LIST DOCUMENTED IN MEDICAL RECORD: ICD-10-PCS | Mod: CPTII,S$GLB,, | Performed by: NURSE PRACTITIONER

## 2022-08-01 PROCEDURE — 1160F RVW MEDS BY RX/DR IN RCRD: CPT | Mod: CPTII,S$GLB,, | Performed by: NURSE PRACTITIONER

## 2022-08-01 PROCEDURE — 1160F PR REVIEW ALL MEDS BY PRESCRIBER/CLIN PHARMACIST DOCUMENTED: ICD-10-PCS | Mod: CPTII,S$GLB,, | Performed by: NURSE PRACTITIONER

## 2022-08-01 PROCEDURE — 36415 COLL VENOUS BLD VENIPUNCTURE: CPT | Mod: PO | Performed by: NURSE PRACTITIONER

## 2022-08-01 PROCEDURE — 3078F DIAST BP <80 MM HG: CPT | Mod: CPTII,S$GLB,, | Performed by: NURSE PRACTITIONER

## 2022-08-01 PROCEDURE — 99999 PR PBB SHADOW E&M-EST. PATIENT-LVL IV: ICD-10-PCS | Mod: PBBFAC,,, | Performed by: NURSE PRACTITIONER

## 2022-08-01 PROCEDURE — 99395 PREV VISIT EST AGE 18-39: CPT | Mod: S$GLB,,, | Performed by: NURSE PRACTITIONER

## 2022-08-01 PROCEDURE — 3008F BODY MASS INDEX DOCD: CPT | Mod: CPTII,S$GLB,, | Performed by: NURSE PRACTITIONER

## 2022-08-01 PROCEDURE — 3008F PR BODY MASS INDEX (BMI) DOCUMENTED: ICD-10-PCS | Mod: CPTII,S$GLB,, | Performed by: NURSE PRACTITIONER

## 2022-08-01 PROCEDURE — 99999 PR PBB SHADOW E&M-EST. PATIENT-LVL IV: CPT | Mod: PBBFAC,,, | Performed by: NURSE PRACTITIONER

## 2022-08-01 PROCEDURE — 99395 PR PREVENTIVE VISIT,EST,18-39: ICD-10-PCS | Mod: S$GLB,,, | Performed by: NURSE PRACTITIONER

## 2022-08-01 PROCEDURE — 3074F SYST BP LT 130 MM HG: CPT | Mod: CPTII,S$GLB,, | Performed by: NURSE PRACTITIONER

## 2022-08-01 PROCEDURE — 80053 COMPREHEN METABOLIC PANEL: CPT | Mod: PO | Performed by: NURSE PRACTITIONER

## 2022-08-01 PROCEDURE — 85025 COMPLETE CBC W/AUTO DIFF WBC: CPT | Mod: PO | Performed by: NURSE PRACTITIONER

## 2022-08-01 PROCEDURE — 84443 ASSAY THYROID STIM HORMONE: CPT | Mod: PO | Performed by: NURSE PRACTITIONER

## 2022-08-01 PROCEDURE — 3074F PR MOST RECENT SYSTOLIC BLOOD PRESSURE < 130 MM HG: ICD-10-PCS | Mod: CPTII,S$GLB,, | Performed by: NURSE PRACTITIONER

## 2022-08-01 PROCEDURE — 3078F PR MOST RECENT DIASTOLIC BLOOD PRESSURE < 80 MM HG: ICD-10-PCS | Mod: CPTII,S$GLB,, | Performed by: NURSE PRACTITIONER

## 2022-08-01 PROCEDURE — 80061 LIPID PANEL: CPT | Performed by: NURSE PRACTITIONER

## 2022-08-01 PROCEDURE — 1159F MED LIST DOCD IN RCRD: CPT | Mod: CPTII,S$GLB,, | Performed by: NURSE PRACTITIONER

## 2022-08-01 NOTE — PROGRESS NOTES
Subjective:       Patient ID: Magalis Russell is a 36 y.o. female.    Chief Complaint: Annual Exam    Mrs. Russell presents to visit for annual wellness exam.  She has no acute complaints today.  Due for routine labs.  Did recently have COVID in June, and does report having daily headaches since with some mild intermittent fatigue.      Patient Active Problem List   Diagnosis    Routine adult health maintenance    History of cervical dysplasia    Palpitations    Lower extremity pain, anterior, left    History of supraventricular tachycardia    Cervicalgia    Numbness and tingling       Family History   Problem Relation Age of Onset    Heart failure Mother     Aneurysm Father     Breast cancer Sister 45    Colon cancer Neg Hx     Ovarian cancer Neg Hx     Thrombosis Neg Hx      Past Surgical History:   Procedure Laterality Date    CERVICAL BIOPSY  W/ LOOP ELECTRODE EXCISION      LOOP ELECTROSURGICAL EXCISION PROCEDURE (LEEP)           Current Outpatient Medications:     hydrocortisone 2.5 % cream, Apply topically., Disp: , Rfl:     ibuprofen (ADVIL,MOTRIN) 800 MG tablet, Take 1 tablet (800 mg total) by mouth 3 (three) times daily as needed for Pain., Disp: 30 tablet, Rfl: 0    LIDOcaine (LIDODERM) 5 %, Place 1 patch onto the skin once daily. Remove & Discard patch within 12 hours or as directed by MD, Disp: 30 patch, Rfl: 1    loratadine (CLARITIN) 10 mg tablet, Take 1 tablet (10 mg total) by mouth once daily., Disp: 90 tablet, Rfl: 0    meclizine (ANTIVERT) 25 mg tablet, Take 1 tablet (25 mg total) by mouth daily as needed., Disp: 20 tablet, Rfl: 0    metoprolol succinate (TOPROL-XL) 25 MG 24 hr tablet, , Disp: , Rfl:     montelukast (SINGULAIR) 10 mg tablet, Take 10 mg by mouth nightly as needed., Disp: , Rfl:     SPRINTEC, 28, 0.25-35 mg-mcg per tablet, TAKE ONE TABLET BY MOUTH EVERY DAY, Disp: 28 tablet, Rfl: 11    Review of Systems   Constitutional: Positive for fatigue. Negative for activity  "change, appetite change, chills and fever.   Eyes: Negative for visual disturbance.   Respiratory: Negative for cough and shortness of breath.    Cardiovascular: Negative for chest pain and palpitations.   Gastrointestinal: Negative for abdominal pain, constipation, diarrhea, nausea and vomiting.   Genitourinary: Negative for dysuria and frequency.   Musculoskeletal: Positive for back pain and neck pain. Negative for arthralgias, gait problem and myalgias.   Neurological: Positive for headaches. Negative for dizziness, syncope, light-headedness and numbness.   Psychiatric/Behavioral: Negative for dysphoric mood. The patient is not nervous/anxious.        Objective:   /70   Pulse 73   Temp 97.7 °F (36.5 °C) (Temporal)   Resp 18   Ht 5' 7" (1.702 m)   Wt 82.6 kg (182 lb 1.6 oz)   LMP 07/14/2022   SpO2 96%   BMI 28.52 kg/m²      Physical Exam  Vitals reviewed.   Constitutional:       General: She is not in acute distress.     Appearance: Normal appearance. She is well-developed. She is not ill-appearing or diaphoretic.   HENT:      Head: Normocephalic.      Right Ear: Tympanic membrane normal.      Nose: Nose normal. No congestion or rhinorrhea.      Mouth/Throat:      Mouth: Mucous membranes are moist.      Pharynx: Oropharynx is clear. No oropharyngeal exudate or posterior oropharyngeal erythema.   Eyes:      General:         Right eye: No discharge.         Left eye: No discharge.      Extraocular Movements: Extraocular movements intact.      Conjunctiva/sclera: Conjunctivae normal.      Pupils: Pupils are equal, round, and reactive to light.   Cardiovascular:      Rate and Rhythm: Normal rate and regular rhythm.      Pulses: Normal pulses.      Heart sounds: Normal heart sounds. No murmur heard.    No friction rub. No gallop.   Pulmonary:      Effort: Pulmonary effort is normal. No respiratory distress.      Breath sounds: Normal breath sounds. No rales.   Abdominal:      Palpations: Abdomen is soft. " "     Tenderness: There is no abdominal tenderness. There is no guarding.   Musculoskeletal:         General: Normal range of motion.      Cervical back: Normal range of motion and neck supple. No tenderness.      Right lower leg: No edema.      Left lower leg: No edema.   Lymphadenopathy:      Cervical: No cervical adenopathy.   Skin:     General: Skin is warm and dry.      Coloration: Skin is not pale.      Findings: No erythema.   Neurological:      Mental Status: She is alert and oriented to person, place, and time.   Psychiatric:         Mood and Affect: Mood normal.         Behavior: Behavior normal.         Assessment & Plan     Problem List Items Addressed This Visit        Cardiac/Vascular    History of supraventricular tachycardia    Current Assessment & Plan     Most recent episode was in June when she had COVID.  She was able to resolve without having to go to the emergency room.  Continue metoprolol.              Orthopedic    Cervicalgia    Current Assessment & Plan     Tried PT twice, and chiropractor without any relief.  Discussed the option for pain management.  She has been seen virtually by them in the past, and was prescribed a muscle relaxer but it triggered her palpitations.  She has concerns with certain medications for her SVT.  Continue conservative treatment and follow-up if symptoms worsen.              Other    Routine adult health maintenance - Primary    Current Assessment & Plan     No concerns on physical exam.  Routine labs today.           Relevant Orders    CBC Auto Differential    Comprehensive Metabolic Panel    TSH    Lipid Panel           Follow up in about 1 year (around 8/1/2023) for annual wellness exam. .          Portions of this note may have been created with voice recognition software. Occasional "wrong-word" or "sound-a-like" substitutions may have occurred due to the inherent limitations of voice recognition software. Please, read the note carefully and recognize, " using context, where substitutions have occurred.

## 2022-08-01 NOTE — ASSESSMENT & PLAN NOTE
01/04/22 0700   Vent Settings   FIO2 (%) 80 %  (Per ABG)   SpO2/FIO2 Ratio 115   CMV Rate Set 22  (per ABG)   Back-Up Rate 20   PC Set 26   PEEP/VENT (cm H2O) 14 cm H20   Insp Time (sec) 1 sec  (per ABG)   Insp Rise Time % 50 %   Pressure Trigger 3.0 Most recent episode was in June when she had COVID.  She was able to resolve without having to go to the emergency room.  Continue metoprolol.

## 2022-08-01 NOTE — ASSESSMENT & PLAN NOTE
Tried PT twice, and chiropractor without any relief.  Discussed the option for pain management.  She has been seen virtually by them in the past, and was prescribed a muscle relaxer but it triggered her palpitations.  She has concerns with certain medications for her SVT.  Continue conservative treatment and follow-up if symptoms worsen.

## 2022-08-11 ENCOUNTER — PATIENT MESSAGE (OUTPATIENT)
Dept: FAMILY MEDICINE | Facility: CLINIC | Age: 37
End: 2022-08-11

## 2022-10-31 PROBLEM — Z00.00 ROUTINE ADULT HEALTH MAINTENANCE: Status: RESOLVED | Noted: 2018-09-26 | Resolved: 2022-10-31

## 2022-12-05 ENCOUNTER — PATIENT MESSAGE (OUTPATIENT)
Dept: PSYCHIATRY | Facility: CLINIC | Age: 37
End: 2022-12-05
Payer: COMMERCIAL

## 2022-12-05 ENCOUNTER — OFFICE VISIT (OUTPATIENT)
Dept: INTERNAL MEDICINE | Facility: CLINIC | Age: 37
End: 2022-12-05
Payer: COMMERCIAL

## 2022-12-05 DIAGNOSIS — F41.1 GAD (GENERALIZED ANXIETY DISORDER): Primary | ICD-10-CM

## 2022-12-05 PROCEDURE — 99213 PR OFFICE/OUTPT VISIT, EST, LEVL III, 20-29 MIN: ICD-10-PCS | Mod: 95,,, | Performed by: NURSE PRACTITIONER

## 2022-12-05 PROCEDURE — 99213 OFFICE O/P EST LOW 20 MIN: CPT | Mod: 95,,, | Performed by: NURSE PRACTITIONER

## 2022-12-05 PROCEDURE — 1160F PR REVIEW ALL MEDS BY PRESCRIBER/CLIN PHARMACIST DOCUMENTED: ICD-10-PCS | Mod: CPTII,95,, | Performed by: NURSE PRACTITIONER

## 2022-12-05 PROCEDURE — 1159F MED LIST DOCD IN RCRD: CPT | Mod: CPTII,95,, | Performed by: NURSE PRACTITIONER

## 2022-12-05 PROCEDURE — 1159F PR MEDICATION LIST DOCUMENTED IN MEDICAL RECORD: ICD-10-PCS | Mod: CPTII,95,, | Performed by: NURSE PRACTITIONER

## 2022-12-05 PROCEDURE — 1160F RVW MEDS BY RX/DR IN RCRD: CPT | Mod: CPTII,95,, | Performed by: NURSE PRACTITIONER

## 2022-12-05 RX ORDER — FLUOXETINE HYDROCHLORIDE 20 MG/1
20 CAPSULE ORAL DAILY
Qty: 30 CAPSULE | Refills: 0 | Status: SHIPPED | OUTPATIENT
Start: 2022-12-05 | End: 2023-04-21 | Stop reason: CLARIF

## 2022-12-05 NOTE — PROGRESS NOTES
Subjective:       Patient ID: Magalis Russell is a 37 y.o. female.    Chief Complaint: No chief complaint on file.    The patient location is: [place]  The chief complaint leading to consultation is: Anxiety    Visit type: audiovisual    Face to Face time with patient: 12 minutes  15 minutes of total time spent on the encounter, which includes face to face time and non-face to face time preparing to see the patient (eg, review of tests), Obtaining and/or reviewing separately obtained history, Documenting clinical information in the electronic or other health record, Independently interpreting results (not separately reported) and communicating results to the patient/family/caregiver, or Care coordination (not separately reported).       Each patient to whom he or she provides medical services by telemedicine is:  (1) informed of the relationship between the physician and patient and the respective role of any other health care provider with respect to management of the patient; and (2) notified that he or she may decline to receive medical services by telemedicine and may withdraw from such care at any time.    Notes:   Mrs. Russell presents to visit with complaint of anxiety. Has been feeling overwhelmed for several years. Affects her daily. Has did some online self assessments, and noted that they said she had anxiety as well. Denies any problems with depression. Denies SI/thoughts of self harm.         Patient Active Problem List   Diagnosis    History of cervical dysplasia    Palpitations    Lower extremity pain, anterior, left    History of supraventricular tachycardia    Cervicalgia    Numbness and tingling    ALONDRA (generalized anxiety disorder)       Family History   Problem Relation Age of Onset    Heart failure Mother     Aneurysm Father     Breast cancer Sister 45    Colon cancer Neg Hx     Ovarian cancer Neg Hx     Thrombosis Neg Hx      Past Surgical History:   Procedure Laterality Date    CERVICAL BIOPSY   W/ LOOP ELECTRODE EXCISION      LOOP ELECTROSURGICAL EXCISION PROCEDURE (LEEP)           Current Outpatient Medications:     FLUoxetine 20 MG capsule, Take 1 capsule (20 mg total) by mouth once daily., Disp: 30 capsule, Rfl: 0    hydrocortisone 2.5 % cream, Apply topically., Disp: , Rfl:     ibuprofen (ADVIL,MOTRIN) 800 MG tablet, Take 1 tablet (800 mg total) by mouth 3 (three) times daily as needed for Pain., Disp: 30 tablet, Rfl: 0    LIDOcaine (LIDODERM) 5 %, Place 1 patch onto the skin once daily. Remove & Discard patch within 12 hours or as directed by MD, Disp: 30 patch, Rfl: 1    meclizine (ANTIVERT) 25 mg tablet, Take 1 tablet (25 mg total) by mouth daily as needed., Disp: 20 tablet, Rfl: 0    metoprolol succinate (TOPROL-XL) 25 MG 24 hr tablet, , Disp: , Rfl:     montelukast (SINGULAIR) 10 mg tablet, Take 10 mg by mouth nightly as needed., Disp: , Rfl:     SPRINTEC, 28, 0.25-35 mg-mcg per tablet, TAKE ONE TABLET BY MOUTH EVERY DAY, Disp: 28 tablet, Rfl: 11    Review of Systems   Constitutional:  Negative for activity change, fatigue and unexpected weight change.   HENT:  Negative for hearing loss, rhinorrhea and trouble swallowing.    Eyes:  Negative for discharge and visual disturbance.   Respiratory:  Negative for chest tightness, shortness of breath and wheezing.    Cardiovascular:  Negative for chest pain and palpitations.   Gastrointestinal:  Negative for blood in stool, constipation, diarrhea and vomiting.   Endocrine: Negative for polydipsia and polyuria.   Genitourinary:  Negative for difficulty urinating, dysuria, hematuria and menstrual problem.   Musculoskeletal:  Negative for arthralgias, joint swelling and neck pain.   Neurological:  Negative for weakness and headaches.   Psychiatric/Behavioral:  Negative for confusion, dysphoric mood, self-injury, sleep disturbance and suicidal ideas. The patient is nervous/anxious.      Objective:   There were no vitals taken for this visit.     Physical  "Exam  Constitutional:       General: She is not in acute distress.     Appearance: Normal appearance. She is not ill-appearing.   Pulmonary:      Effort: Pulmonary effort is normal. No respiratory distress.   Neurological:      General: No focal deficit present.      Mental Status: She is alert and oriented to person, place, and time.   Psychiatric:         Mood and Affect: Mood normal.       Assessment & Plan     GAD7 12/5/2022   1. Feeling nervous, anxious, or on edge? 3   2. Not being able to stop or control worrying? 2   3. Worrying too much about different things? 3   4. Trouble relaxing? 2   5. Being so restless that it is hard to sit still? 2   6. Becoming easily annoyed or irritable? 1   7. Feeling afraid as if something awful might happen? 3   ALONDRA-7 Score 16         Problem List Items Addressed This Visit          Psychiatric    ALONDRA (generalized anxiety disorder) - Primary    Current Assessment & Plan     Trial of fluoxetine. Discussed other options as well. Follow up in one month.          Relevant Medications    FLUoxetine 20 MG capsule    Other Relevant Orders    Ambulatory referral/consult to Psychiatry   Ref to psych for possibly counseling/therapy.       Follow up in about 1 month (around 1/5/2023).            Portions of this note may have been created with voice recognition software. Occasional "wrong-word" or "sound-a-like" substitutions may have occurred due to the inherent limitations of voice recognition software. Please, read the note carefully and recognize, using context, where substitutions have occurred.         "

## 2022-12-26 ENCOUNTER — PATIENT MESSAGE (OUTPATIENT)
Dept: INTERNAL MEDICINE | Facility: CLINIC | Age: 37
End: 2022-12-26
Payer: COMMERCIAL

## 2022-12-26 DIAGNOSIS — B37.9 CANDIDA INFECTION: Primary | ICD-10-CM

## 2022-12-27 RX ORDER — FLUCONAZOLE 150 MG/1
150 TABLET ORAL DAILY
Qty: 2 TABLET | Refills: 0 | Status: SHIPPED | OUTPATIENT
Start: 2022-12-27 | End: 2022-12-28

## 2023-01-11 ENCOUNTER — OFFICE VISIT (OUTPATIENT)
Dept: INTERNAL MEDICINE | Facility: CLINIC | Age: 38
End: 2023-01-11
Payer: COMMERCIAL

## 2023-01-11 DIAGNOSIS — N89.8 VAGINAL DISCHARGE: ICD-10-CM

## 2023-01-11 DIAGNOSIS — B96.89 BACTERIAL VAGINOSIS: Primary | ICD-10-CM

## 2023-01-11 DIAGNOSIS — N76.0 BACTERIAL VAGINOSIS: Primary | ICD-10-CM

## 2023-01-11 PROCEDURE — 1160F RVW MEDS BY RX/DR IN RCRD: CPT | Mod: CPTII,95,, | Performed by: NURSE PRACTITIONER

## 2023-01-11 PROCEDURE — 99213 PR OFFICE/OUTPT VISIT, EST, LEVL III, 20-29 MIN: ICD-10-PCS | Mod: 95,,, | Performed by: NURSE PRACTITIONER

## 2023-01-11 PROCEDURE — 1159F PR MEDICATION LIST DOCUMENTED IN MEDICAL RECORD: ICD-10-PCS | Mod: CPTII,95,, | Performed by: NURSE PRACTITIONER

## 2023-01-11 PROCEDURE — 1160F PR REVIEW ALL MEDS BY PRESCRIBER/CLIN PHARMACIST DOCUMENTED: ICD-10-PCS | Mod: CPTII,95,, | Performed by: NURSE PRACTITIONER

## 2023-01-11 PROCEDURE — 99213 OFFICE O/P EST LOW 20 MIN: CPT | Mod: 95,,, | Performed by: NURSE PRACTITIONER

## 2023-01-11 PROCEDURE — 1159F MED LIST DOCD IN RCRD: CPT | Mod: CPTII,95,, | Performed by: NURSE PRACTITIONER

## 2023-01-11 RX ORDER — METRONIDAZOLE 500 MG/1
500 TABLET ORAL EVERY 12 HOURS
Qty: 14 TABLET | Refills: 0 | Status: SHIPPED | OUTPATIENT
Start: 2023-01-11 | End: 2023-01-18

## 2023-01-11 NOTE — PROGRESS NOTES
The patient location is: in [place] in parked car  The chief complaint leading to consultation is: vaginal discharge    Visit type: audiovisual    Face to Face time with patient: 1:45 PM-1:58 PM  15 minutes of total time spent on the encounter, which includes face to face time and non-face to face time preparing to see the patient (eg, review of tests), Obtaining and/or reviewing separately obtained history, Documenting clinical information in the electronic or other health record, Independently interpreting results (not separately reported) and communicating results to the patient/family/caregiver, or Care coordination (not separately reported).       Each patient to whom he or she provides medical services by telemedicine is:  (1) informed of the relationship between the physician and patient and the respective role of any other health care provider with respect to management of the patient; and (2) notified that he or she may decline to receive medical services by telemedicine and may withdraw from such care at any time.    Subjective:       Patient ID: Magalis Russell is a 37 y.o. female.    Chief Complaint: Vaginal Discharge    Mrs. Russell is seen as a virtual visit for vaginal discharge and irritation.  Initially thought she was developing a yeast infection and took fluconazole on 12/26/2022 without improvement.  Reports she recently was in a Jacuzzi and used a bubbling shampoo.  She also douche which she does not do routinely.  Reports symptoms are similar to prior BV.  Describes discharge as thin milky white discharge.  Just finished her menstrual cycle.  Cycles are regular.  Denies any urinary symptoms.  Denies any concern for STI.  Sexually active with 1 partner.      Vaginal Discharge  The patient's primary symptoms include a genital odor and vaginal discharge. The patient's pertinent negatives include no genital itching, genital lesions, genital rash, pelvic pain or vaginal bleeding. This is a new problem.  The current episode started 1 to 4 weeks ago. The problem occurs constantly. The problem has been unchanged. The patient is experiencing no pain. Pertinent negatives include no abdominal pain, chills, constipation, diarrhea, discolored urine, dysuria, fever, flank pain, frequency, headaches, hematuria, nausea, sore throat, urgency or vomiting. The vaginal discharge was milky, thin and white. The vaginal bleeding is typical of menses. She is sexually active. No, her partner does not have an STD. Her menstrual history has been regular. Her past medical history is significant for vaginosis.     Patient Active Problem List   Diagnosis    History of cervical dysplasia    Palpitations    Lower extremity pain, anterior, left    History of supraventricular tachycardia    Cervicalgia    Numbness and tingling    ALONDRA (generalized anxiety disorder)       Family History   Problem Relation Age of Onset    Heart failure Mother     Aneurysm Father     Breast cancer Sister 45    Colon cancer Neg Hx     Ovarian cancer Neg Hx     Thrombosis Neg Hx      Past Surgical History:   Procedure Laterality Date    CERVICAL BIOPSY  W/ LOOP ELECTRODE EXCISION      LOOP ELECTROSURGICAL EXCISION PROCEDURE (LEEP)           Current Outpatient Medications:     FLUoxetine 20 MG capsule, Take 1 capsule (20 mg total) by mouth once daily., Disp: 30 capsule, Rfl: 0    hydrocortisone 2.5 % cream, Apply topically., Disp: , Rfl:     ibuprofen (ADVIL,MOTRIN) 800 MG tablet, Take 1 tablet (800 mg total) by mouth 3 (three) times daily as needed for Pain., Disp: 30 tablet, Rfl: 0    LIDOcaine (LIDODERM) 5 %, Place 1 patch onto the skin once daily. Remove & Discard patch within 12 hours or as directed by MD, Disp: 30 patch, Rfl: 1    meclizine (ANTIVERT) 25 mg tablet, Take 1 tablet (25 mg total) by mouth daily as needed., Disp: 20 tablet, Rfl: 0    metoprolol succinate (TOPROL-XL) 25 MG 24 hr tablet, , Disp: , Rfl:     metroNIDAZOLE (FLAGYL) 500 MG tablet, Take 1  "tablet (500 mg total) by mouth every 12 (twelve) hours. for 7 days, Disp: 14 tablet, Rfl: 0    montelukast (SINGULAIR) 10 mg tablet, Take 10 mg by mouth nightly as needed., Disp: , Rfl:     SPRINTEC, 28, 0.25-35 mg-mcg per tablet, TAKE ONE TABLET BY MOUTH EVERY DAY, Disp: 28 tablet, Rfl: 11    Review of Systems   Constitutional:  Negative for activity change, chills, fever and unexpected weight change.   HENT:  Negative for hearing loss, rhinorrhea, sore throat and trouble swallowing.    Eyes:  Negative for discharge and visual disturbance.   Respiratory:  Negative for chest tightness and wheezing.    Cardiovascular:  Negative for chest pain and palpitations.   Gastrointestinal:  Negative for abdominal pain, blood in stool, constipation, diarrhea, nausea and vomiting.   Endocrine: Negative for polydipsia and polyuria.   Genitourinary:  Positive for vaginal discharge and vaginal pain ("irritation"). Negative for difficulty urinating, dysuria, flank pain, frequency, hematuria, menstrual problem, pelvic pain, urgency and vaginal bleeding.   Musculoskeletal:  Negative for arthralgias, joint swelling and neck pain.   Neurological:  Negative for weakness and headaches.   Psychiatric/Behavioral:  Negative for confusion and dysphoric mood.      Objective:   There were no vitals taken for this visit.     Physical Exam  Constitutional:       General: She is not in acute distress.     Appearance: Normal appearance. She is not ill-appearing.   Eyes:      Conjunctiva/sclera: Conjunctivae normal.   Pulmonary:      Effort: Pulmonary effort is normal. No respiratory distress.   Neurological:      Mental Status: She is alert and oriented to person, place, and time.   Psychiatric:         Mood and Affect: Mood normal.         Behavior: Behavior normal.       Assessment & Plan     Problem List Items Addressed This Visit    None  Visit Diagnoses       Bacterial vaginosis    -  Primary    Trreat for BV based on clinical presentation.  " "If symptoms do not improve with metronidazole, recommend vaginosis and STI screening.    Relevant Medications    metroNIDAZOLE (FLAGYL) 500 MG tablet    Vaginal discharge        Relevant Medications    metroNIDAZOLE (FLAGYL) 500 MG tablet            MARIELLE Caballero      Portions of this note may have been created with voice recognition software. Occasional "wrong-word" or "sound-a-like" substitutions may have occurred due to the inherent limitations of voice recognition software. Please, read the note carefully and recognize, using context, where substitutions have occurred.     "

## 2023-03-15 ENCOUNTER — PATIENT MESSAGE (OUTPATIENT)
Dept: PSYCHIATRY | Facility: CLINIC | Age: 38
End: 2023-03-15
Payer: COMMERCIAL

## 2023-03-29 ENCOUNTER — TELEPHONE (OUTPATIENT)
Dept: PSYCHIATRY | Facility: CLINIC | Age: 38
End: 2023-03-29
Payer: COMMERCIAL

## 2023-03-29 ENCOUNTER — PATIENT MESSAGE (OUTPATIENT)
Dept: PSYCHIATRY | Facility: CLINIC | Age: 38
End: 2023-03-29

## 2023-03-29 NOTE — TELEPHONE ENCOUNTER
Call and r/s n/p appt with Serge Joel. To Thursday 4/13/23 at 2:00 pm virtual . Pt also has 2 f/u appts schedule.

## 2023-04-11 ENCOUNTER — OFFICE VISIT (OUTPATIENT)
Dept: PSYCHIATRY | Facility: CLINIC | Age: 38
End: 2023-04-11
Payer: COMMERCIAL

## 2023-04-11 DIAGNOSIS — F41.1 GENERALIZED ANXIETY DISORDER: Primary | ICD-10-CM

## 2023-04-11 PROCEDURE — 90791 PSYCH DIAGNOSTIC EVALUATION: CPT | Mod: 95,,, | Performed by: SOCIAL WORKER

## 2023-04-11 PROCEDURE — 90791 PR PSYCHIATRIC DIAGNOSTIC EVALUATION: ICD-10-PCS | Mod: 95,,, | Performed by: SOCIAL WORKER

## 2023-04-11 NOTE — LETTER
April 11, 2023        Clotilde Honeycutt [place]  [address] [address] [address]             [facility]  [address] [facility] [address]  Phone: [phone]  Fax: [phone]   Patient: Magalis Russell   MR Number: [id number]   YOB: 1985   Date of Visit: 4/11/2023       Dear Clotilde Guthrie NP:    Thank you for referring Magalis Russell to me for evaluation. Below are the relevant portions of my assessment and plan of care.    If you have questions, please do not hesitate to call me. I look forward to following Magalis along with you.    Sincerely,      Serge Joel, MARIBETH           CC    No Recipients

## 2023-04-11 NOTE — PROGRESS NOTES
"Psychiatry Initial Visit (PhD/LCSW)  Diagnostic Interview - CPT 30580    Date: 4/11/2023    Site: [place]    Referral source: Clotilde Guthrie NP    Clinical status of patient: Outpatient    Magalis Russell, a 37 y.o. female, for initial evaluation visit.  Met with patient.   Virtual visit with synchronous audio and video    Each patient to whom he provides medical services by telemedicine is:  (1) informed of the relationship between the physician and patient and the respective role of any other health care provider with respect to management of the patient; and (2) notified that he or she may decline to receive medical services by telemedicine and may withdraw from such care at any time.      Location:  Her home in [place]    Chief complaint/reason for encounter: anxiety    History of present illness:   From [facility] note on 12/5/22:  Mrs. Russell presents to visit with complaint of anxiety. Has been feeling overwhelmed for several years. Affects her daily. Has did some online self assessments, and noted that they said she had anxiety as well. Denies any problems with depression. Denies SI/thoughts of self harm.     Today the patient states:  She started to have anxiety during COVID.  "People were dying left and right."  She contracted COVID in June of last year.  She was not allowing anyone to come in the house.  She was double masking for the pandemic.  Her  has a nephew who got a hold of bad drugs.  He went "crazy."  He would try to take of the screens to the door.  He would talk sexual to the patient.  He went to mental institutions.  The  was hiding the complaints against her nephew.  She had surveillance on her home.  He was caught by the police.  She was given a two year restraining order.  He went to [place].  He was released last week to his mother in [place].  She worried if her children are not in her presence.  She will not sleep till her son makes it home.  Her " "sleep has been "crazy."  She works for [employer] part time.  It is only seasonal.  She will go to bed at 1130 and she is up by 530.  She sleeps continuously.  She has a good appetite.  She denies any crying spells.  She has good focus at work.  She has some irritability.  Prior to COVID, she would have big house parties for family.  She practically raised her nephew.  He is 21.        Pain: 10 at times in her neck and back.  She does not have pain at this time.    Symptoms:   Mood: poor concentration and social isolation  Anxiety: excessive anxiety/worry, restlessness/keyed up, and irritability  Substance abuse: denied  Cognitive functioning: denied  Health behaviors:  SVT    Psychiatric history: She has never been in counseling.  She denies any thoughts of hurting herself past or present.      Medical history: She has SVT.  She took a muscle relaxer in the past.  Her heart was palpitating.  She has bad sinus infections.  She had an abnormal pap smear several years ago.  The abnormal cells were removed.      Family history of psychiatric illness: Her mother, brother, three aunts with alcoholism.  She had two uncles that were drinkers.  She wonders if her sister has depression.  She is a breast cancer survivor.     Social history (marriage, employment, etc.): Patient's mother, Natividad, is  since  due to CHF.  She was 49.  She lived in [place].  She was a homemake.  She had not been ill prior.  The patient was [age].  She was a smoker.  She was a recovering alcoholic.  Patient's father, Fawad,  in  at the age of 65 due to an aneurysm.  He was not ill prior.  He lived in [place].  He was a retired .  He was a Marine.  He did engineering.  Her parent's were  till her mother's death.  They were  about twenty five years.  "It was a crazy marriage."  Her father was stationed in [employer], [place], and [place].  Her mother did not follow him.  She has two older " "siblings that are not for her father.  Her mother did not follow her father in the service.  She has "outside siblings" in [place].  Her older full brother, Onur, 42, lives in [place].  He is single with about seven children.  He works at the plant in construction.  He is a functioning alcoholic.  Her older half siblings are:  , lives in [place].  She is single with no children.  She was a  and in management.  She had open heart surgery two years ago.  She is on disability.  She worked at [employer] for twenty years.  She does not drink.  Prieto, 44, lives in [place], [place].  He was in the [employer].  He was a .  He is  with four children.  Her does not drink.  Her older half sibling were always with them.  Her maternal grandmother helped to raise them.  Her grandmother, Sandra,  one year after her mother from renal failure.  She was on dialysis due to high blood pressure.  She had twelve children.  She lived in [place].  She would see her mother on a daily basis.  Her mother would pull disappear acts in her childhood.  Her mother had a whole separate home in [place].  She would only take the patient there. She denies any physical or sexual abuse.  The patient graduated from [employer] in .  She ran [employer] in [employer].  She graduated from [employer] in  with a bachelors in early childhood education.  She is trying to obtain her Masters from [employer] online in secondary education.  She is eighty percent completed.  She is ready to leave the babies and move to the bigger children.  She worked at a  center in [place].  She worked at [employer]. She was there for ten years.  She was with  to age 3.  She went to [employer]. 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  She loves her job.  She is  to Judson Baires, for nine years.  They have been together for 21 years.  She met him " in [place].  He is a supervisor at [employer] in [place].  He makes PVC pipes.  She reports a good marriage.  They have two children.  They are:  Dequan Chairez, 20, lives with the patient.  He is attending [employer] for business management.  He completed a process technology course.  He is single with no children.  Judson, 14, goes to [employer] in the 9th grade.  She is into dancing, gymnastics, and hip hop.  She does not have problems with her children.  She was raised Worship.  She was baptized a Hinduism.  She goes to [employer].  She goes three Sundays out of the month.  She believes in God and Kyle.  She enjoys reading.  She is reading an urban fiction.  She will walk every morning.  She likes to listen to [employer].  She will watch Netflix when she can.      Substance use:   Alcohol: none  She has never liked it due to her family history.   Drugs: none   Tobacco: none   Caffeine: She drinks four 12 oz cans of Coke per day.    Current medications and drug reactions (include OTC, herbal): see medication list   She was given Prozac in January, but she has a fear of taking it.  She was worried it would trigger the SVT.    Strengths and liabilities: Strength: Patient accepts guidance/feedback, Strength: Patient is expressive/articulate., Strength: Patient is intelligent., Strength: Patient is motivated for change., Strength: Patient is physically healthy., Strength: Patient has positive support network., Strength: Patient has reasonable judgment., Strength: Patient is stable., Liability: Patient lacks coping skills.    Current Evaluation:     Mental Status Exam:  General Appearance:  age appropriate, casually dressed, neatly groomed   Speech: normal tone, normal rate, normal pitch, normal volume      Level of Cooperation: cooperative      Thought Processes: normal and logical   Mood: anxiety      Thought Content: normal, no suicidality, no homicidality, delusions, or paranoia   Affect:  anxious   Orientation: Oriented x3   Memory: recent >  intact, remote >  intact   Attention Span & Concentration: intact   Fund of General Knowledge: intact and appropriate to age and level of education   Abstract Reasoning:    Judgment & Insight: fair     Language  intact     Diagnostic Impression - Plan:         Plan:individual psychotherapy    Return to Clinic: as scheduled    Length of Service (minutes): 45

## 2023-04-20 ENCOUNTER — OFFICE VISIT (OUTPATIENT)
Dept: PSYCHIATRY | Facility: CLINIC | Age: 38
End: 2023-04-20
Payer: COMMERCIAL

## 2023-04-20 DIAGNOSIS — F41.1 GENERALIZED ANXIETY DISORDER: Primary | ICD-10-CM

## 2023-04-20 PROCEDURE — 90834 PR PSYCHOTHERAPY W/PATIENT, 45 MIN: ICD-10-PCS | Mod: 95,,, | Performed by: SOCIAL WORKER

## 2023-04-20 PROCEDURE — 90834 PSYTX W PT 45 MINUTES: CPT | Mod: 95,,, | Performed by: SOCIAL WORKER

## 2023-04-20 NOTE — PROGRESS NOTES
"Individual Psychotherapy (PhD/LCSW)    2023    Site:  [place]         Therapeutic Intervention: Met with patient.  Outpatient - Insight oriented psychotherapy 45 min - CPT code 98452   Met with patient.   Virtual visit with synchronous audio and video     Each patient to whom he provides medical services by telemedicine is:  (1) informed of the relationship between the physician and patient and the respective role of any other health care provider with respect to management of the patient; and (2) notified that he or she may decline to receive medical services by telemedicine and may withdraw from such care at any time.       Location:  Her home in [place]    Chief complaint/reason for encounter: anxiety     Interval history and content of current session:  Patient presents to ongoing individual therapy due o anxiety.  Her nephew is with his mother in [place].  She tried not to speak about him because she will then dream about him.  In September, her maternal aunt lives in [place].  They spoke daily.  The last fifteen years they have been very close.  The patient's mother  in .  Her aunt has a daughter in her early twenties.  Her daughter started going with a lisa that was in the street.  She became pregnant, and she moved away to [place].  The patient helped her aunt out financially.  Her aunt does billing and coding.  Her other maternal aunt went to federal jail due to Medicaid fraud.  The aunt she talked to helped to take care of her sister's three children.  Her aunt had a large settlement from a car accident.  She bought a house and a vehicle.  She did not work for awhile.  The patient thought she was close to her first cousin.  She found out her cousin did not like her last September.  She offered to sell the patient's  nude pictures.  The patient threatened to give her cousin a "ass whipping."  Educate the patient that she needs to make the decision to trust her .  " "Validate the need to set boundaries with toxic relationships.  Note that she does not need to be violent with others.  Reflect that the patient and her  have worked hard to build the life they have.  Her family knew that her cousin did not like her.  They did not tell her.  She was not very close to these family members.  Her aunt stopped talking to her that September.  The patient sent a group text to her six aunts.  She asked them to leave her alone.  She received a phone call from her aunts trying to explain what they did.  She feels used and betrayed.  She still keeps her distance from her aunt.  About three weeks ago, her aunt called.  She wanted to have a conversation with the patient in public.  The patient did not want to talk about it.  Her aunt said it is not "Godly" of her to not allow the patient to explain herself.  She said the patient was threatening her daughter.  Her aunt told the patient's brother that she would send her to group home if she hurt her daughter.  She is tearful in session.  She treated her niece like a daughter.  She wonders what her mother would be like now.  Her mother was the oldest sister.                     Treatment plan:  Target symptoms: anxiety   Why chosen therapy is appropriate versus another modality: relevant to diagnosis  Outcome monitoring methods: self-report, observation  Therapeutic intervention type: insight oriented psychotherapy, supportive psychotherapy, interactive psychotherapy    Risk parameters:  Patient reports no suicidal ideation  Patient reports no homicidal ideation  Patient reports no self-injurious behavior  Patient reports no violent behavior    Verbal deficits: None    Patient's response to intervention:  The patient's response to intervention is accepting, motivated.    Progress toward goals and other mental status changes:  The patient's progress toward goals is fair .    Diagnosis:   Generalized Anxiety Disorder    Plan:  individual " psychotherapy    Return to clinic: as scheduled    Length of Service (minutes): 45

## 2023-04-21 ENCOUNTER — HOSPITAL ENCOUNTER (EMERGENCY)
Facility: HOSPITAL | Age: 38
Discharge: HOME | End: 2023-04-21
Attending: EMERGENCY MEDICINE
Payer: COMMERCIAL

## 2023-04-21 VITALS
HEIGHT: 67 IN | HEART RATE: 64 BPM | OXYGEN SATURATION: 100 % | DIASTOLIC BLOOD PRESSURE: 75 MMHG | WEIGHT: 183.88 LBS | SYSTOLIC BLOOD PRESSURE: 126 MMHG | RESPIRATION RATE: 18 BRPM | TEMPERATURE: 98 F | BODY MASS INDEX: 28.86 KG/M2

## 2023-04-21 DIAGNOSIS — R07.9 CHEST PAIN: ICD-10-CM

## 2023-04-21 LAB
ALBUMIN SERPL BCP-MCNC: 3.4 G/DL (ref 3.5–5.2)
ALP SERPL-CCNC: 60 U/L (ref 55–135)
ALT SERPL W/O P-5'-P-CCNC: 10 U/L (ref 10–44)
ANION GAP SERPL CALC-SCNC: 14 MMOL/L (ref 8–16)
AST SERPL-CCNC: 11 U/L (ref 10–40)
BASOPHILS # BLD AUTO: 0.05 K/UL (ref 0–0.2)
BASOPHILS NFR BLD: 0.6 % (ref 0–1.9)
BILIRUB SERPL-MCNC: 0.4 MG/DL (ref 0.1–1)
BNP SERPL-MCNC: 27 PG/ML (ref 0–99)
BUN SERPL-MCNC: 12 MG/DL (ref 6–20)
CALCIUM SERPL-MCNC: 8.9 MG/DL (ref 8.7–10.5)
CHLORIDE SERPL-SCNC: 109 MMOL/L (ref 95–110)
CO2 SERPL-SCNC: 17 MMOL/L (ref 23–29)
CREAT SERPL-MCNC: 0.9 MG/DL (ref 0.5–1.4)
DIFFERENTIAL METHOD: ABNORMAL
EOSINOPHIL # BLD AUTO: 0.5 K/UL (ref 0–0.5)
EOSINOPHIL NFR BLD: 5.8 % (ref 0–8)
ERYTHROCYTE [DISTWIDTH] IN BLOOD BY AUTOMATED COUNT: 13.3 % (ref 11.5–14.5)
EST. GFR  (NO RACE VARIABLE): >60 ML/MIN/1.73 M^2
GLUCOSE SERPL-MCNC: 105 MG/DL (ref 70–110)
HCT VFR BLD AUTO: 38.5 % (ref 37–48.5)
HGB BLD-MCNC: 12.6 G/DL (ref 12–16)
IMM GRANULOCYTES # BLD AUTO: 0.02 K/UL (ref 0–0.04)
IMM GRANULOCYTES NFR BLD AUTO: 0.2 % (ref 0–0.5)
LYMPHOCYTES # BLD AUTO: 3.7 K/UL (ref 1–4.8)
LYMPHOCYTES NFR BLD: 40.5 % (ref 18–48)
MCH RBC QN AUTO: 31.1 PG (ref 27–31)
MCHC RBC AUTO-ENTMCNC: 32.7 G/DL (ref 32–36)
MCV RBC AUTO: 95 FL (ref 82–98)
MONOCYTES # BLD AUTO: 0.7 K/UL (ref 0.3–1)
MONOCYTES NFR BLD: 7.9 % (ref 4–15)
NEUTROPHILS # BLD AUTO: 4.1 K/UL (ref 1.8–7.7)
NEUTROPHILS NFR BLD: 45 % (ref 38–73)
NRBC BLD-RTO: 0 /100 WBC
PLATELET # BLD AUTO: 281 K/UL (ref 150–450)
PMV BLD AUTO: 10.5 FL (ref 9.2–12.9)
POTASSIUM SERPL-SCNC: 3.5 MMOL/L (ref 3.5–5.1)
PROT SERPL-MCNC: 6.9 G/DL (ref 6–8.4)
RBC # BLD AUTO: 4.05 M/UL (ref 4–5.4)
SODIUM SERPL-SCNC: 140 MMOL/L (ref 136–145)
TROPONIN I SERPL DL<=0.01 NG/ML-MCNC: <0.006 NG/ML (ref 0–0.03)
TROPONIN I SERPL DL<=0.01 NG/ML-MCNC: <0.006 NG/ML (ref 0–0.03)
WBC # BLD AUTO: 9.02 K/UL (ref 3.9–12.7)

## 2023-04-21 PROCEDURE — 85025 COMPLETE CBC W/AUTO DIFF WBC: CPT | Mod: ER | Performed by: EMERGENCY MEDICINE

## 2023-04-21 PROCEDURE — 94761 N-INVAS EAR/PLS OXIMETRY MLT: CPT | Mod: ER

## 2023-04-21 PROCEDURE — 83880 ASSAY OF NATRIURETIC PEPTIDE: CPT | Mod: ER | Performed by: EMERGENCY MEDICINE

## 2023-04-21 PROCEDURE — 93010 EKG 12-LEAD: ICD-10-PCS | Mod: ,,, | Performed by: INTERNAL MEDICINE

## 2023-04-21 PROCEDURE — 93005 ELECTROCARDIOGRAM TRACING: CPT | Mod: ER

## 2023-04-21 PROCEDURE — 93010 ELECTROCARDIOGRAM REPORT: CPT | Mod: ,,, | Performed by: INTERNAL MEDICINE

## 2023-04-21 PROCEDURE — 84484 ASSAY OF TROPONIN QUANT: CPT | Mod: ER | Performed by: EMERGENCY MEDICINE

## 2023-04-21 PROCEDURE — 99285 EMERGENCY DEPT VISIT HI MDM: CPT | Mod: 25,ER

## 2023-04-21 PROCEDURE — 80053 COMPREHEN METABOLIC PANEL: CPT | Mod: ER | Performed by: EMERGENCY MEDICINE

## 2023-04-21 NOTE — ED PROVIDER NOTES
Encounter Date: 4/21/2023       History     Chief Complaint   Patient presents with    Chest Pain     On and off since last night. Woke pt out of her sleep 30 minutes PTA     Chief complaint: Chest pain    History of present illness:  37-year-old female developed left-sided chest pain last evening about 630.  The pain lasted a very short period of time and disappeared.  She was awakened at 4:30 a.m. this morning with similar pain and presented to the emergency department for evaluation.  She has a history of SVT and is currently on metoprolol for this last attack was 4 months ago.  She denies any other radiation of the pain.  She does relate history of cervical disc disease that periodically radiates into her shoulder but not to this portion of her chest.  Patient does not smoke.  Nondiabetic.  Nonhypertensive.  No hyperlipidemia.  Negative family history.    The history is provided by the patient.   Review of patient's allergies indicates:   Allergen Reactions    Egg white Blisters    Strawberry Shortness Of Breath and Blisters    Tomato (solanum lycopersicum) Shortness Of Breath and Blisters    Oranges [orange]     Wheat containing prod Hives     Past Medical History:   Diagnosis Date    Abnormal Pap smear of cervix     Back pain     Neck pain     Seasonal allergies     SVT (supraventricular tachycardia)     Vertigo      Past Surgical History:   Procedure Laterality Date    CERVICAL BIOPSY  W/ LOOP ELECTRODE EXCISION      LOOP ELECTROSURGICAL EXCISION PROCEDURE (LEEP)       Family History   Problem Relation Age of Onset    Heart failure Mother     Aneurysm Father     Breast cancer Sister 45    Colon cancer Neg Hx     Ovarian cancer Neg Hx     Thrombosis Neg Hx      Social History     Tobacco Use    Smoking status: Never    Smokeless tobacco: Never   Substance Use Topics    Alcohol use: No    Drug use: No     Review of Systems   Constitutional: Negative.    HENT: Negative.     Eyes: Negative.    Cardiovascular:   Positive for chest pain.   Gastrointestinal:  Negative for abdominal pain.   Neurological:  Negative for dizziness.   Psychiatric/Behavioral: Negative.       Physical Exam     Initial Vitals [04/21/23 0507]   BP Pulse Resp Temp SpO2   (!) 160/93 80 20 98.1 °F (36.7 °C) 100 %      MAP       --         Physical Exam    Nursing note and vitals reviewed.  Constitutional: She appears well-developed and well-nourished.   HENT:   Head: Normocephalic and atraumatic.   Left Ear: External ear normal.   Nose: Nose normal.   Mouth/Throat: Oropharynx is clear and moist.   Eyes: Conjunctivae and EOM are normal. Pupils are equal, round, and reactive to light.   Neck: Neck supple.   Normal range of motion.  Cardiovascular:  Normal rate, regular rhythm and intact distal pulses.           Pulmonary/Chest: Breath sounds normal. No respiratory distress.   Abdominal: Abdomen is soft. There is no abdominal tenderness. There is no rebound and no guarding.   Musculoskeletal:         General: Normal range of motion.      Cervical back: Normal range of motion and neck supple.     Neurological: She is alert and oriented to person, place, and time. GCS score is 15. GCS eye subscore is 4. GCS verbal subscore is 5. GCS motor subscore is 6.   Skin: Skin is warm and dry. Capillary refill takes less than 2 seconds.   Psychiatric: She has a normal mood and affect. Her behavior is normal.       ED Course   Procedures  Labs Reviewed   CBC W/ AUTO DIFFERENTIAL - Abnormal; Notable for the following components:       Result Value    MCH 31.1 (*)     All other components within normal limits   COMPREHENSIVE METABOLIC PANEL - Abnormal; Notable for the following components:    CO2 17 (*)     Albumin 3.4 (*)     All other components within normal limits   TROPONIN I   B-TYPE NATRIURETIC PEPTIDE   TROPONIN I     EKG Readings: (Independently Interpreted)   Initial Reading: No STEMI. Rhythm: Normal Sinus Rhythm. Heart Rate: 77. Ectopy: No Ectopy. ST Segments:  Normal ST Segments. T Waves: Normal. Axis: Normal. Clinical Impression: Normal Sinus Rhythm Other Impression: Low-voltage QRS/borderline EKG     Unchanged from previous EKG dated September 16, 2020.  ECG Results              EKG 12-lead (In process)  Result time 04/21/23 09:04:59      In process by Interface, Lab In [facility] (04/21/23 09:04:59)                   Narrative:    Test Reason : R07.9,    Vent. Rate : 077 BPM     Atrial Rate : 077 BPM     P-R Int : 146 ms          QRS Dur : 078 ms      QT Int : 398 ms       P-R-T Axes : 034 -09 000 degrees     QTc Int : 450 ms    Normal sinus rhythm with sinus arrhythmia  Low voltage QRS  Borderline Abnormal ECG  When compared with ECG of 16-SEP-2020 10:34,  No significant change was found    Referred By: [email or web address]   SELF           Confirmed By:                                   Results for orders placed or performed during the hospital encounter of 04/21/23   CBC auto differential   Result Value Ref Range    WBC 9.02 3.90 - 12.70 K/uL    RBC 4.05 4.00 - 5.40 M/uL    Hemoglobin 12.6 12.0 - 16.0 g/dL    Hematocrit 38.5 37.0 - 48.5 %    MCV 95 82 - 98 fL    MCH 31.1 (H) 27.0 - 31.0 pg    MCHC 32.7 32.0 - 36.0 g/dL    RDW 13.3 11.5 - 14.5 %    Platelets 281 150 - 450 K/uL    MPV 10.5 9.2 - 12.9 fL    Immature Granulocytes 0.2 0.0 - 0.5 %    Gran # (ANC) 4.1 1.8 - 7.7 K/uL    Immature Grans (Abs) 0.02 0.00 - 0.04 K/uL    Lymph # 3.7 1.0 - 4.8 K/uL    Mono # 0.7 0.3 - 1.0 K/uL    Eos # 0.5 0.0 - 0.5 K/uL    Baso # 0.05 0.00 - 0.20 K/uL    nRBC 0 0 /100 WBC    Gran % 45.0 38.0 - 73.0 %    Lymph % 40.5 18.0 - 48.0 %    Mono % 7.9 4.0 - 15.0 %    Eosinophil % 5.8 0.0 - 8.0 %    Basophil % 0.6 0.0 - 1.9 %    Differential Method Automated    Comprehensive metabolic panel   Result Value Ref Range    Sodium 140 136 - 145 mmol/L    Potassium 3.5 3.5 - 5.1 mmol/L    Chloride 109 95 - 110 mmol/L    CO2 17 (L) 23 - 29 mmol/L    Glucose 105 70 - 110 mg/dL    BUN 12 6 - 20 mg/dL     Creatinine 0.9 0.5 - 1.4 mg/dL    Calcium 8.9 8.7 - 10.5 mg/dL    Total Protein 6.9 6.0 - 8.4 g/dL    Albumin 3.4 (L) 3.5 - 5.2 g/dL    Total Bilirubin 0.4 0.1 - 1.0 mg/dL    Alkaline Phosphatase 60 55 - 135 U/L    AST 11 10 - 40 U/L    ALT 10 10 - 44 U/L    Anion Gap 14 8 - 16 mmol/L    eGFR >60.0 >60 mL/min/1.73 m^2   Troponin I #1   Result Value Ref Range    Troponin I <0.006 0.000 - 0.026 ng/mL   BNP   Result Value Ref Range    BNP 27 0 - 99 pg/mL   Troponin I #2   Result Value Ref Range    Troponin I <0.006 0.000 - 0.026 ng/mL         Imaging Results              X-Ray Chest AP Portable (Final result)  Result time 04/21/23 06:20:55      Final result by Ta Will MD (04/21/23 06:20:55)                   Impression:      No acute process seen.      Electronically signed by: Ta Will MD  Date:    04/21/2023  Time:    06:20               Narrative:    EXAMINATION:  XR CHEST AP PORTABLE    CLINICAL HISTORY:  Chest Pain;    FINDINGS:  Single view of the chest.  Comparison 09/16/2020    Cardiac silhouette is normal.  The lungs demonstrate no evidence of active disease.  No evidence of pleural effusion or pneumothorax.  Bones appear intact.                        Wet Read by Sultana Puente DO (04/21/23 06:08:46, [facility] - Emergency Dept, Emergency Medicine)    Cardiac size normal, no pneumothorax, no infiltrated.                                      Medications - No data to display  Medical Decision Making:   Independently Interpreted Test(s):   I have ordered and independently interpreted X-rays - see summary below.  I have ordered and independently interpreted EKG Reading(s) - see summary below  Clinical Tests:   Lab Tests: Ordered and Reviewed  Radiological Study: Ordered and Reviewed  Medical Tests: Ordered and Reviewed  Additional MDM:   Differential Diagnosis:   Symptom: Chest pain. <> The follow diagnoses were considered and will be evaluated: Cervical Radiculitis, Chest Wall Pain,  Costochondritis, Esophageal Spasm, Esophagitis, GERD, Hiatal Hernia, Mitral Valve Prolapse and Pleurisy. The follow diagnoses were considered, but were felt to be of low probability: Acute MI and Dissecting Aorta.      Heart Score:    History:          Slightly suspicious.  ECG:             Normal  Age:               Less than 45 years  Risk factors: 1-2 risk factors  Troponin:       Less than or equal to normal limit  Final Score: 1          ED Course as of 23 0922      0605 Care turned over from Dr. LEONARD Corbin.  [LB]   0911 Troponin I: <0.006 [LB]      ED Course User Index  [LB] Sultanaangelica Puente, DO          8:06 AM patient presents to emergency department with chest pain.  She states that she had chest pain last night about 630.  She was awakened at 430 this morning.  She reports that it was a dull sharp pain.  It was not associated with any nausea, vomiting, shortness of breath or diaphoresis.  She did note that she had been gassy on her way to the emergency department.  She notes that she had been taking ibuprofen this week secondary to knee discomfort.  Patient denies any known history of coronary artery disease, hypertension, hyperlipidemia, diabetes.  She notes that her mom  at age 49 secondary to congestive heart failure.  Patient denies any calf pain, calf swelling, prior PE, prior PE, travel, or history of cancer.  Patient denies any preceding viral infections.  Patient reports that the pain is better when she sits up.  She is currently pain-free.      General:  Patient is awake alert oriented.  No acute distress.  Heart: Regular rate and rhythm without murmur.  Lungs: Clear to auscultation without rales rhonchi wheezes.  Abdomen:  Soft, no rebound or guarding no masses.  Extremities:  No calf pain, no edema to the legs.      Patient's spouse is at bedside.  Discussed heart score pathway.  Recommended over-the-counter Pepcid AC.  Avoiding nonsteroidal anti-inflammatories.  Use  Tylenol but do not exceed more than 3 g in 24 hour.     9:15AM  Repeat troponin negative.      Clinical Impression:   Final diagnoses:  [R07.9] Chest pain        ED Disposition Condition    Discharge Stable          ED Prescriptions    None       Follow-up Information       Follow up With Specialties Details Why Contact Info    Clotilde Honeycutt NP Family Medicine In 2 days Return to emergency department for:  Worsening pain, nausea, vomiting, breaking out in sweat, fever, passing out, other concerns [id number] [address] [address]  [phone]               Sultana Puente,   04/21/23 0922

## 2023-04-21 NOTE — Clinical Note
"Magalis"Tali Russell was seen and treated in our emergency department on 4/21/2023.  She may return to work on 04/22/2023.       If you have any questions or concerns, please don't hesitate to call.      Sultana Puente, DO"

## 2023-04-21 NOTE — Clinical Note
Dequan Webb accompanied their caregiver to the emergency department on 4/21/2023. They may return to work on 04/22/2023.      If you have any questions or concerns, please don't hesitate to call.      Sultana Puente, DO

## 2023-04-21 NOTE — DISCHARGE INSTRUCTIONS
Avoid red sauces, caffeine, peppermint, peppers,  alcohol, motrin/ibuprofen/advil/NSAIDS, and tobacco.      Eat a bland diet.    Return to the ED for:   Blood in stool, black tarry stool, dizziness, light-headedness, worsening pain, passing out, vomiting blood or other concerns.     Avoid nonsteroidal anti-inflammatories for now.     Use over-the-counter Pepcid AC for the next 1-2 weeks.    Use Tylenol for pain control-do not exceed more than 3 g or 3000 mg in a 24 hour.

## 2023-05-18 ENCOUNTER — OFFICE VISIT (OUTPATIENT)
Dept: INTERNAL MEDICINE | Facility: CLINIC | Age: 38
End: 2023-05-18
Payer: COMMERCIAL

## 2023-05-18 ENCOUNTER — TELEPHONE (OUTPATIENT)
Dept: PSYCHIATRY | Facility: CLINIC | Age: 38
End: 2023-05-18
Payer: COMMERCIAL

## 2023-05-18 DIAGNOSIS — N76.0 BACTERIAL VAGINOSIS: Primary | ICD-10-CM

## 2023-05-18 DIAGNOSIS — N89.8 VAGINAL DISCHARGE: ICD-10-CM

## 2023-05-18 DIAGNOSIS — B96.89 BACTERIAL VAGINOSIS: Primary | ICD-10-CM

## 2023-05-18 PROCEDURE — 99213 PR OFFICE/OUTPT VISIT, EST, LEVL III, 20-29 MIN: ICD-10-PCS | Mod: 95,,, | Performed by: NURSE PRACTITIONER

## 2023-05-18 PROCEDURE — 1160F PR REVIEW ALL MEDS BY PRESCRIBER/CLIN PHARMACIST DOCUMENTED: ICD-10-PCS | Mod: CPTII,95,, | Performed by: NURSE PRACTITIONER

## 2023-05-18 PROCEDURE — 1160F RVW MEDS BY RX/DR IN RCRD: CPT | Mod: CPTII,95,, | Performed by: NURSE PRACTITIONER

## 2023-05-18 PROCEDURE — 1159F MED LIST DOCD IN RCRD: CPT | Mod: CPTII,95,, | Performed by: NURSE PRACTITIONER

## 2023-05-18 PROCEDURE — 99213 OFFICE O/P EST LOW 20 MIN: CPT | Mod: 95,,, | Performed by: NURSE PRACTITIONER

## 2023-05-18 PROCEDURE — 1159F PR MEDICATION LIST DOCUMENTED IN MEDICAL RECORD: ICD-10-PCS | Mod: CPTII,95,, | Performed by: NURSE PRACTITIONER

## 2023-05-18 RX ORDER — FLUCONAZOLE 150 MG/1
150 TABLET ORAL DAILY
Qty: 2 TABLET | Refills: 0 | Status: SHIPPED | OUTPATIENT
Start: 2023-05-18 | End: 2023-07-10 | Stop reason: ALTCHOICE

## 2023-05-18 RX ORDER — NORGESTIMATE AND ETHINYL ESTRADIOL 0.25-0.035
1 KIT ORAL
COMMUNITY
Start: 2023-05-03 | End: 2023-07-10 | Stop reason: SDUPTHER

## 2023-05-18 RX ORDER — METRONIDAZOLE 500 MG/1
500 TABLET ORAL EVERY 12 HOURS
Qty: 14 TABLET | Refills: 0 | Status: SHIPPED | OUTPATIENT
Start: 2023-05-18 | End: 2023-05-25

## 2023-05-18 NOTE — PROGRESS NOTES
Subjective:       Patient ID: Magalis Russell is a 37 y.o. female.    Chief Complaint: No chief complaint on file.    The patient location is: [place]  The chief complaint leading to consultation is: [place]    Visit type: audiovisual    Face to Face time with patient: 9 minutes  12 minutes of total time spent on the encounter, which includes face to face time and non-face to face time preparing to see the patient (eg, review of tests), Obtaining and/or reviewing separately obtained history, Documenting clinical information in the electronic or other health record, Independently interpreting results (not separately reported) and communicating results to the patient/family/caregiver, or Care coordination (not separately reported).     Each patient to whom he or she provides medical services by telemedicine is:  (1) informed of the relationship between the physician and patient and the respective role of any other health care provider with respect to management of the patient; and (2) notified that he or she may decline to receive medical services by telemedicine and may withdraw from such care at any time.    Notes:   Mrs. Russell presents to visit for vaginal discharge. Reports being treated for BV/vaginal discharge in January. Has been having symptoms for approx 1 week. Has been having intermittent water discharge, milky white. Mild odor. No concerns for STIs.       Vaginal Discharge  The patient's primary symptoms include a genital odor and vaginal discharge. The patient's pertinent negatives include no genital itching, genital lesions, genital rash, missed menses, pelvic pain or vaginal bleeding. The current episode started in the past 7 days. Pertinent negatives include no abdominal pain, anorexia, back pain, chills, constipation, diarrhea, dysuria, fever, flank pain, frequency, headaches, hematuria, joint swelling, nausea, painful intercourse, urgency or vomiting. The vaginal discharge was milky, white and  malodorous. There has been no bleeding. Nothing aggravates the symptoms. Treatments tried: PH feminine wash and gel. Her menstrual history has been regular. Her past medical history is significant for vaginosis.     Patient Active Problem List   Diagnosis    History of cervical dysplasia    Palpitations    Lower extremity pain, anterior, left    History of supraventricular tachycardia    Cervicalgia    Numbness and tingling    ALONDRA (generalized anxiety disorder)       Family History   Problem Relation Age of Onset    Heart failure Mother     Aneurysm Father     Breast cancer Sister 45    Colon cancer Neg Hx     Ovarian cancer Neg Hx     Thrombosis Neg Hx      Past Surgical History:   Procedure Laterality Date    CERVICAL BIOPSY  W/ LOOP ELECTRODE EXCISION      LOOP ELECTROSURGICAL EXCISION PROCEDURE (LEEP)           Current Outpatient Medications:     fluconazole (DIFLUCAN) 150 MG Tab, Take 1 tablet (150 mg total) by mouth once daily. May repeat in 72 hours if needed., Disp: 2 tablet, Rfl: 0    meclizine (ANTIVERT) 25 mg tablet, Take 1 tablet (25 mg total) by mouth daily as needed., Disp: 20 tablet, Rfl: 0    metoprolol succinate (TOPROL-XL) 25 MG 24 hr tablet, , Disp: , Rfl:     metroNIDAZOLE (FLAGYL) 500 MG tablet, Take 1 tablet (500 mg total) by mouth every 12 (twelve) hours. for 7 days, Disp: 14 tablet, Rfl: 0    montelukast (SINGULAIR) 10 mg tablet, Take 10 mg by mouth nightly as needed., Disp: , Rfl:     SPRINTEC, 28, 0.25-35 mg-mcg per tablet, Take 1 tablet by mouth., Disp: , Rfl:     Review of Systems   Constitutional:  Negative for activity change, chills, fever and unexpected weight change.   HENT:  Negative for hearing loss, rhinorrhea and trouble swallowing.    Eyes:  Negative for discharge and visual disturbance.   Respiratory:  Negative for chest tightness and wheezing.    Cardiovascular:  Negative for chest pain and palpitations.   Gastrointestinal:  Negative for abdominal pain, anorexia, blood in  "stool, constipation, diarrhea, nausea and vomiting.   Endocrine: Negative for polydipsia and polyuria.   Genitourinary:  Positive for vaginal discharge. Negative for difficulty urinating, dysuria, flank pain, frequency, hematuria, menstrual problem, missed menses, pelvic pain and urgency.   Musculoskeletal:  Negative for arthralgias, back pain, joint swelling and neck pain.   Neurological:  Negative for weakness and headaches.   Psychiatric/Behavioral:  Negative for confusion and dysphoric mood.      Objective:   There were no vitals taken for this visit.     Physical Exam  Constitutional:       General: She is not in acute distress.     Appearance: Normal appearance. She is not ill-appearing.   Pulmonary:      Effort: Pulmonary effort is normal. No respiratory distress.   Neurological:      General: No focal deficit present.      Mental Status: She is alert and oriented to person, place, and time.   Psychiatric:         Mood and Affect: Mood normal.       Assessment & Plan     Problem List Items Addressed This Visit    None  Visit Diagnoses       Bacterial vaginosis    -  Primary    Relevant Medications    metroNIDAZOLE (FLAGYL) 500 MG tablet    Vaginal discharge        Relevant Medications    metroNIDAZOLE (FLAGYL) 500 MG tablet    fluconazole (DIFLUCAN) 150 MG Tab    Other Relevant Orders    VAGINOSIS SCREEN BY DNA PROBE            Follow up if symptoms worsen or fail to improve.          Portions of this note may have been created with voice recognition software. Occasional "wrong-word" or "sound-a-like" substitutions may have occurred due to the inherent limitations of voice recognition software. Please, read the note carefully and recognize, using context, where substitutions have occurred.       "

## 2023-05-31 ENCOUNTER — OFFICE VISIT (OUTPATIENT)
Dept: PSYCHIATRY | Facility: CLINIC | Age: 38
End: 2023-05-31
Payer: COMMERCIAL

## 2023-05-31 DIAGNOSIS — F41.1 GENERALIZED ANXIETY DISORDER: Primary | ICD-10-CM

## 2023-05-31 PROCEDURE — 90834 PR PSYCHOTHERAPY W/PATIENT, 45 MIN: ICD-10-PCS | Mod: 95,,, | Performed by: SOCIAL WORKER

## 2023-05-31 PROCEDURE — 90834 PSYTX W PT 45 MINUTES: CPT | Mod: 95,,, | Performed by: SOCIAL WORKER

## 2023-05-31 NOTE — PROGRESS NOTES
"Individual Psychotherapy (PhD/LCSW)    5/31/2023    [facility]         Therapeutic Intervention: Met with patient.  Outpatient - Insight oriented psychotherapy 45 min - CPT code 37993   Virtual visit with synchronous audio and video     Each patient to whom he provides medical services by telemedicine is:  (1) informed of the relationship between the physician and patient and the respective role of any other health care provider with respect to management of the patient; and (2) notified that he or she may decline to receive medical services by telemedicine and may withdraw from such care at any time.       Location:  Her home in [place]    Chief complaint/reason for encounter: anxiety     Interval history and content of current session:  Patient presents to ongoing individual therapy due o anxiety.  Her children are still in bed.  They are twenty and fifteen.  Her son is at [employer].  They travel for the summer.  They will leave for [facility] and [facility].  Her  has a family reunion in [place], [place].  The end of the school year was "crazy" for her.  He has been by herself for a few months.  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  All of the children are "supposed to be potty trained."  She went to the emergency room the day after the last session.  She was having chest pains at 5am.  They did a cardiac work up and her heart is fine.  About two days after the panic attack, she realized it was because she brought up old feelings.  She was in prayer this morning asking God to mend her heart.  She is trying to find a way to say "no."  Educate the patient about the difference between enabling and tough love.  Explore why she is keeping the amount of money given secret from her .  Note that her resources are limited.  Encourage allowing herself to enjoy the travel experiences.  If she knows the money is for a good cause, she does not hesitate to give them money.  She has a cousin, who smokes " cigarettes.  She does not want to support the habit.  Last week, she gave family $1800.  She was generous with graduation gifts.  She feels that her  comes from a rich family.  The patient has not always had finances.  She has had to depend on people in the past.  Her  is judgmental.  She feels he is selfish.            Treatment plan:  Target symptoms: anxiety   Why chosen therapy is appropriate versus another modality: relevant to diagnosis  Outcome monitoring methods: self-report, observation  Therapeutic intervention type: insight oriented psychotherapy, supportive psychotherapy, interactive psychotherapy     Risk parameters:  Patient reports no suicidal ideation  Patient reports no homicidal ideation  Patient reports no self-injurious behavior  Patient reports no violent behavior     Verbal deficits: None     Patient's response to intervention:  The patient's response to intervention is accepting, motivated.     Progress toward goals and other mental status changes:  The patient's progress toward goals is fair .     Diagnosis:   Generalized Anxiety Disorder     Plan:  individual psychotherapy     Return to clinic: as scheduled     Length of Service (minutes): 45

## 2023-07-10 ENCOUNTER — LAB VISIT (OUTPATIENT)
Dept: LAB | Facility: HOSPITAL | Age: 38
End: 2023-07-10
Attending: NURSE PRACTITIONER
Payer: COMMERCIAL

## 2023-07-10 ENCOUNTER — OFFICE VISIT (OUTPATIENT)
Dept: INTERNAL MEDICINE | Facility: CLINIC | Age: 38
End: 2023-07-10
Payer: COMMERCIAL

## 2023-07-10 VITALS
DIASTOLIC BLOOD PRESSURE: 80 MMHG | TEMPERATURE: 98 F | BODY MASS INDEX: 29.48 KG/M2 | HEART RATE: 63 BPM | OXYGEN SATURATION: 98 % | WEIGHT: 187.81 LBS | HEIGHT: 67 IN | SYSTOLIC BLOOD PRESSURE: 124 MMHG

## 2023-07-10 DIAGNOSIS — M46.1 SACROILIITIS: ICD-10-CM

## 2023-07-10 DIAGNOSIS — F41.1 GAD (GENERALIZED ANXIETY DISORDER): ICD-10-CM

## 2023-07-10 DIAGNOSIS — N89.8 VAGINAL DISCHARGE: ICD-10-CM

## 2023-07-10 DIAGNOSIS — Z12.31 BREAST CANCER SCREENING BY MAMMOGRAM: ICD-10-CM

## 2023-07-10 DIAGNOSIS — R00.2 PALPITATIONS: ICD-10-CM

## 2023-07-10 DIAGNOSIS — M54.2 CERVICALGIA: ICD-10-CM

## 2023-07-10 DIAGNOSIS — Z00.00 ROUTINE ADULT HEALTH MAINTENANCE: ICD-10-CM

## 2023-07-10 DIAGNOSIS — Z00.00 ROUTINE ADULT HEALTH MAINTENANCE: Primary | ICD-10-CM

## 2023-07-10 DIAGNOSIS — Z86.79 HISTORY OF SUPRAVENTRICULAR TACHYCARDIA: ICD-10-CM

## 2023-07-10 DIAGNOSIS — Z30.41 ENCOUNTER FOR SURVEILLANCE OF CONTRACEPTIVE PILLS: ICD-10-CM

## 2023-07-10 DIAGNOSIS — M47.816 LUMBAR SPONDYLOSIS: ICD-10-CM

## 2023-07-10 LAB
ANION GAP SERPL CALC-SCNC: 7 MMOL/L (ref 8–16)
BUN SERPL-MCNC: 11 MG/DL (ref 6–20)
CALCIUM SERPL-MCNC: 8.8 MG/DL (ref 8.7–10.5)
CHLORIDE SERPL-SCNC: 109 MMOL/L (ref 95–110)
CHOLEST SERPL-MCNC: 118 MG/DL (ref 120–199)
CHOLEST/HDLC SERPL: 3 {RATIO} (ref 2–5)
CO2 SERPL-SCNC: 23 MMOL/L (ref 23–29)
CREAT SERPL-MCNC: 0.9 MG/DL (ref 0.5–1.4)
EST. GFR  (NO RACE VARIABLE): >60 ML/MIN/1.73 M^2
ESTIMATED AVG GLUCOSE: 111 MG/DL (ref 68–131)
GLUCOSE SERPL-MCNC: 107 MG/DL (ref 70–110)
HBA1C MFR BLD: 5.5 % (ref 4–5.6)
HDLC SERPL-MCNC: 39 MG/DL (ref 40–75)
HDLC SERPL: 33.1 % (ref 20–50)
LDLC SERPL CALC-MCNC: 50.2 MG/DL (ref 63–159)
NONHDLC SERPL-MCNC: 79 MG/DL
POTASSIUM SERPL-SCNC: 4.1 MMOL/L (ref 3.5–5.1)
SODIUM SERPL-SCNC: 139 MMOL/L (ref 136–145)
TRIGL SERPL-MCNC: 144 MG/DL (ref 30–150)
TSH SERPL DL<=0.005 MIU/L-ACNC: 1.7 UIU/ML (ref 0.4–4)

## 2023-07-10 PROCEDURE — 3008F PR BODY MASS INDEX (BMI) DOCUMENTED: ICD-10-PCS | Mod: CPTII,S$GLB,, | Performed by: NURSE PRACTITIONER

## 2023-07-10 PROCEDURE — 81514 NFCT DS BV&VAGINITIS DNA ALG: CPT | Performed by: NURSE PRACTITIONER

## 2023-07-10 PROCEDURE — 3044F HG A1C LEVEL LT 7.0%: CPT | Mod: CPTII,S$GLB,, | Performed by: NURSE PRACTITIONER

## 2023-07-10 PROCEDURE — 3079F PR MOST RECENT DIASTOLIC BLOOD PRESSURE 80-89 MM HG: ICD-10-PCS | Mod: CPTII,S$GLB,, | Performed by: NURSE PRACTITIONER

## 2023-07-10 PROCEDURE — 99999 PR PBB SHADOW E&M-EST. PATIENT-LVL IV: ICD-10-PCS | Mod: PBBFAC,,, | Performed by: NURSE PRACTITIONER

## 2023-07-10 PROCEDURE — 1159F MED LIST DOCD IN RCRD: CPT | Mod: CPTII,S$GLB,, | Performed by: NURSE PRACTITIONER

## 2023-07-10 PROCEDURE — 36415 COLL VENOUS BLD VENIPUNCTURE: CPT | Mod: PO | Performed by: NURSE PRACTITIONER

## 2023-07-10 PROCEDURE — 3008F BODY MASS INDEX DOCD: CPT | Mod: CPTII,S$GLB,, | Performed by: NURSE PRACTITIONER

## 2023-07-10 PROCEDURE — 1160F RVW MEDS BY RX/DR IN RCRD: CPT | Mod: CPTII,S$GLB,, | Performed by: NURSE PRACTITIONER

## 2023-07-10 PROCEDURE — 84443 ASSAY THYROID STIM HORMONE: CPT | Mod: PO | Performed by: NURSE PRACTITIONER

## 2023-07-10 PROCEDURE — 99999 PR PBB SHADOW E&M-EST. PATIENT-LVL IV: CPT | Mod: PBBFAC,,, | Performed by: NURSE PRACTITIONER

## 2023-07-10 PROCEDURE — 1159F PR MEDICATION LIST DOCUMENTED IN MEDICAL RECORD: ICD-10-PCS | Mod: CPTII,S$GLB,, | Performed by: NURSE PRACTITIONER

## 2023-07-10 PROCEDURE — 3044F PR MOST RECENT HEMOGLOBIN A1C LEVEL <7.0%: ICD-10-PCS | Mod: CPTII,S$GLB,, | Performed by: NURSE PRACTITIONER

## 2023-07-10 PROCEDURE — 99395 PR PREVENTIVE VISIT,EST,18-39: ICD-10-PCS | Mod: S$GLB,,, | Performed by: NURSE PRACTITIONER

## 2023-07-10 PROCEDURE — 83036 HEMOGLOBIN GLYCOSYLATED A1C: CPT | Performed by: NURSE PRACTITIONER

## 2023-07-10 PROCEDURE — 3074F PR MOST RECENT SYSTOLIC BLOOD PRESSURE < 130 MM HG: ICD-10-PCS | Mod: CPTII,S$GLB,, | Performed by: NURSE PRACTITIONER

## 2023-07-10 PROCEDURE — 99395 PREV VISIT EST AGE 18-39: CPT | Mod: S$GLB,,, | Performed by: NURSE PRACTITIONER

## 2023-07-10 PROCEDURE — 3074F SYST BP LT 130 MM HG: CPT | Mod: CPTII,S$GLB,, | Performed by: NURSE PRACTITIONER

## 2023-07-10 PROCEDURE — 80048 BASIC METABOLIC PNL TOTAL CA: CPT | Mod: PO | Performed by: NURSE PRACTITIONER

## 2023-07-10 PROCEDURE — 3079F DIAST BP 80-89 MM HG: CPT | Mod: CPTII,S$GLB,, | Performed by: NURSE PRACTITIONER

## 2023-07-10 PROCEDURE — 80061 LIPID PANEL: CPT | Performed by: NURSE PRACTITIONER

## 2023-07-10 PROCEDURE — 1160F PR REVIEW ALL MEDS BY PRESCRIBER/CLIN PHARMACIST DOCUMENTED: ICD-10-PCS | Mod: CPTII,S$GLB,, | Performed by: NURSE PRACTITIONER

## 2023-07-10 RX ORDER — NORGESTIMATE AND ETHINYL ESTRADIOL 0.25-0.035
1 KIT ORAL DAILY
Qty: 90 TABLET | Refills: 3 | Status: SHIPPED | OUTPATIENT
Start: 2023-07-10

## 2023-07-10 RX ORDER — NAPROXEN 500 MG/1
500 TABLET ORAL 2 TIMES DAILY PRN
Qty: 60 TABLET | Refills: 1 | Status: SHIPPED | OUTPATIENT
Start: 2023-07-10

## 2023-07-10 RX ORDER — METOPROLOL SUCCINATE 25 MG/1
25 TABLET, EXTENDED RELEASE ORAL DAILY
Qty: 90 TABLET | Refills: 3 | Status: SHIPPED | OUTPATIENT
Start: 2023-07-10

## 2023-07-10 NOTE — PROGRESS NOTES
Subjective:       Patient ID: Magalis Russell is a 37 y.o. female.    Chief Complaint: Annual Exam (Back pain)    Mrs. Russell presents to visit for annual wellness exam. Due for annual labs. Has annual eye and dental exams. No regular exercise routine.     Also complaining of low back pain that has been going on for years. Has been seen by pain management several years ago. Does exercises on her own that she previously did with PT. Previous imagine reviewed. Intermittent numbness and tingling, greater on L side.     Also has been having intermittent vaginal discharge and odor. Hx of BV. Did not complete last round of flagyl a couple of months ago.     Back Pain  This is a recurrent problem. The current episode started more than 1 year ago. The problem occurs daily. The problem has been waxing and waning since onset. The pain is present in the gluteal, lumbar spine, sacro-iliac and thoracic spine. The quality of the pain is described as aching, burning and shooting. The pain radiates to the left thigh and right thigh. The pain is at a severity of 8/10. The pain is moderate. The pain is Worse during the night. The symptoms are aggravated by bending, position, lying down, sitting and standing. Stiffness is present In the morning and at night. Associated symptoms include leg pain, numbness, paresthesias and tingling. Pertinent negatives include no abdominal pain, bladder incontinence, bowel incontinence, chest pain, dysuria, fever, headaches, paresis, pelvic pain, perianal numbness, weakness or weight loss. The treatment provided mild relief.     Patient Active Problem List   Diagnosis    Routine adult health maintenance    History of cervical dysplasia    Palpitations    Lower extremity pain, anterior, left    History of supraventricular tachycardia    Cervicalgia    Numbness and tingling    ALONDRA (generalized anxiety disorder)       Family History   Problem Relation Age of Onset    Heart failure Mother     Aneurysm Father   "   Breast cancer Sister 45    Colon cancer Neg Hx     Ovarian cancer Neg Hx     Thrombosis Neg Hx      Past Surgical History:   Procedure Laterality Date    CERVICAL BIOPSY  W/ LOOP ELECTRODE EXCISION      LOOP ELECTROSURGICAL EXCISION PROCEDURE (LEEP)           Current Outpatient Medications:     meclizine (ANTIVERT) 25 mg tablet, Take 1 tablet (25 mg total) by mouth daily as needed., Disp: 20 tablet, Rfl: 0    montelukast (SINGULAIR) 10 mg tablet, Take 10 mg by mouth nightly as needed., Disp: , Rfl:     metoprolol succinate (TOPROL-XL) 25 MG 24 hr tablet, Take 1 tablet (25 mg total) by mouth once daily., Disp: 90 tablet, Rfl: 3    metroNIDAZOLE (FLAGYL) 500 MG tablet, Take 1 tablet (500 mg total) by mouth every 12 (twelve) hours. for 7 days, Disp: 14 tablet, Rfl: 0    naproxen (NAPROSYN) 500 MG tablet, Take 1 tablet (500 mg total) by mouth 2 (two) times daily as needed (pain)., Disp: 60 tablet, Rfl: 1    SPRINTEC, 28, 0.25-35 mg-mcg per tablet, Take 1 tablet by mouth once daily., Disp: 90 tablet, Rfl: 3    Review of Systems   Constitutional:  Negative for appetite change, chills, fatigue, fever and weight loss.   Respiratory:  Negative for cough and shortness of breath.    Cardiovascular:  Negative for chest pain and palpitations.   Gastrointestinal:  Negative for abdominal pain, bowel incontinence, diarrhea, nausea and vomiting.   Genitourinary:  Negative for bladder incontinence, dysuria, flank pain, frequency, hematuria and pelvic pain.   Musculoskeletal:  Positive for back pain. Negative for joint swelling and myalgias.   Neurological:  Positive for tingling, numbness and paresthesias. Negative for syncope, speech difficulty, weakness and headaches.     Objective:   /80 (BP Location: Left arm, Patient Position: Sitting, BP Method: Medium (Manual))   Pulse 63   Temp 98.2 °F (36.8 °C) (Temporal)   Ht 5' 7" (1.702 m)   Wt 85.2 kg (187 lb 13.3 oz)   LMP 06/25/2023   SpO2 98%   BMI 29.42 kg/m²    "   Physical Exam  Vitals reviewed.   Constitutional:       General: She is not in acute distress.     Appearance: Normal appearance. She is well-developed. She is not ill-appearing or diaphoretic.   HENT:      Head: Normocephalic.      Right Ear: Tympanic membrane normal.      Left Ear: Tympanic membrane normal.      Nose: Nose normal.      Mouth/Throat:      Mouth: Mucous membranes are moist.      Pharynx: Oropharynx is clear. No oropharyngeal exudate or posterior oropharyngeal erythema.   Eyes:      General:         Right eye: No discharge.         Left eye: No discharge.      Extraocular Movements: Extraocular movements intact.      Conjunctiva/sclera: Conjunctivae normal.      Pupils: Pupils are equal, round, and reactive to light.   Cardiovascular:      Rate and Rhythm: Normal rate and regular rhythm.      Pulses: Normal pulses.      Heart sounds: Normal heart sounds. No murmur heard.    No friction rub. No gallop.   Pulmonary:      Effort: Pulmonary effort is normal. No respiratory distress.      Breath sounds: Normal breath sounds. No rales.   Abdominal:      Palpations: Abdomen is soft.      Tenderness: There is no abdominal tenderness. There is no guarding.   Musculoskeletal:         General: Normal range of motion.      Cervical back: Normal range of motion and neck supple. No tenderness.      Right lower leg: No edema.      Left lower leg: No edema.   Lymphadenopathy:      Cervical: No cervical adenopathy.   Skin:     General: Skin is warm and dry.      Coloration: Skin is not pale.      Findings: No erythema.   Neurological:      Mental Status: She is alert and oriented to person, place, and time.   Psychiatric:         Mood and Affect: Mood normal.         Behavior: Behavior normal.       Assessment & Plan     Problem List Items Addressed This Visit          Psychiatric    ALONDRA (generalized anxiety disorder)    Current Assessment & Plan     Stable without medications.            Cardiac/Vascular     "Palpitations    Relevant Medications    metoprolol succinate (TOPROL-XL) 25 MG 24 hr tablet    History of supraventricular tachycardia    Current Assessment & Plan     No recent flare up.          Relevant Medications    metoprolol succinate (TOPROL-XL) 25 MG 24 hr tablet       Orthopedic    Cervicalgia    Relevant Orders    Ambulatory referral/consult to Pain Clinic       Other    Routine adult health maintenance - Primary    Current Assessment & Plan     No concerns on physical exams. Labs ordered.          Relevant Orders    BASIC METABOLIC PANEL (Completed)    Lipid Panel (Completed)    TSH (Completed)    Hemoglobin A1C (Completed)     Other Visit Diagnoses       Encounter for surveillance of contraceptive pills        Relevant Medications    SPRINTEC, 28, 0.25-35 mg-mcg per tablet    Breast cancer screening by mammogram        Relevant Orders    Mammo Digital Screening Bilat w/ Rodrigo    Vaginal discharge        Relevant Orders    VAGINOSIS SCREEN BY DNA PROBE (Completed)    Lumbar spondylosis        Relevant Medications    naproxen (NAPROSYN) 500 MG tablet    Other Relevant Orders    Ambulatory referral/consult to Pain Clinic    Sacroiliitis        Relevant Medications    naproxen (NAPROSYN) 500 MG tablet    Other Relevant Orders    Ambulatory referral/consult to Pain Clinic          Follow up in about 1 year (around 7/10/2024) for annual wellness exam. .            Portions of this note may have been created with voice recognition software. Occasional "wrong-word" or "sound-a-like" substitutions may have occurred due to the inherent limitations of voice recognition software. Please, read the note carefully and recognize, using context, where substitutions have occurred.       "

## 2023-07-11 ENCOUNTER — PATIENT MESSAGE (OUTPATIENT)
Dept: INTERNAL MEDICINE | Facility: CLINIC | Age: 38
End: 2023-07-11
Payer: COMMERCIAL

## 2023-07-11 DIAGNOSIS — B96.89 BACTERIAL VAGINOSIS: Primary | ICD-10-CM

## 2023-07-11 DIAGNOSIS — N76.0 BACTERIAL VAGINOSIS: Primary | ICD-10-CM

## 2023-07-11 LAB
BACTERIAL VAGINOSIS DNA: POSITIVE
CANDIDA GLABRATA DNA: NEGATIVE
CANDIDA KRUSEI DNA: NEGATIVE
CANDIDA RRNA VAG QL PROBE: NEGATIVE
T VAGINALIS RRNA GENITAL QL PROBE: NEGATIVE

## 2023-07-11 RX ORDER — METRONIDAZOLE 500 MG/1
500 TABLET ORAL EVERY 12 HOURS
Qty: 14 TABLET | Refills: 0 | Status: SHIPPED | OUTPATIENT
Start: 2023-07-11 | End: 2023-07-18

## 2023-07-19 ENCOUNTER — OFFICE VISIT (OUTPATIENT)
Dept: PSYCHIATRY | Facility: CLINIC | Age: 38
End: 2023-07-19
Payer: COMMERCIAL

## 2023-07-19 DIAGNOSIS — F41.1 GENERALIZED ANXIETY DISORDER: Primary | ICD-10-CM

## 2023-07-19 PROCEDURE — 90834 PR PSYCHOTHERAPY W/PATIENT, 45 MIN: ICD-10-PCS | Mod: 95,,, | Performed by: SOCIAL WORKER

## 2023-07-19 PROCEDURE — 3044F HG A1C LEVEL LT 7.0%: CPT | Mod: CPTII,95,, | Performed by: SOCIAL WORKER

## 2023-07-19 PROCEDURE — 90834 PSYTX W PT 45 MINUTES: CPT | Mod: 95,,, | Performed by: SOCIAL WORKER

## 2023-07-19 PROCEDURE — 3044F PR MOST RECENT HEMOGLOBIN A1C LEVEL <7.0%: ICD-10-PCS | Mod: CPTII,95,, | Performed by: SOCIAL WORKER

## 2023-07-19 NOTE — PROGRESS NOTES
Individual Psychotherapy (PhD/LCSW)    7/19/2023    Site:  [facility]         Therapeutic Intervention: Met with patient.  Outpatient - Insight oriented psychotherapy 45 min - CPT code 49944  Virtual visit with synchronous audio and video     Each patient to whom he provides medical services by telemedicine is:  (1) informed of the relationship between the physician and patient and the respective role of any other health care provider with respect to management of the patient; and (2) notified that he or she may decline to receive medical services by telemedicine and may withdraw from such care at any time.       Location:  Her home in [place]    Chief complaint/reason for encounter: anxiety     Interval history and content of current session: Patient presents to ongoing individual therapy due o anxiety.  She was last in session virtually on 5/31/23.  In the last month, she has been enjoying life and vacationing.  She has not been giving money to her family.  One day, she had four individuals reach out to her for money.  She had to take a step back.  She realized she would not be able to go to them.  She has been back in town for one day.  She has gone to [facility], [facility], and [place].  He will being going to [place] on Friday.  She had not been to [facility] before.  They tried different restaurants.  She has been getting along well with her .  She will be taking a birthday trip to [place].  Her whole family is going.  She is turning [age].  She is worried about having a mammogram.  Her  works at a pipe plant.  He has been at the same job for 24 years.  Educate the patient about the difference between enabling and caretaking.  Emphasize that other's growth is stunted.  Praise her thoughts of considering moving to [place].  Encourage continued self care.  She realizes she has grown.  She has been setting better boundaries.  Her aunt told her that she does not care for the patient anymore.  She  "was frustrated that the patient doesn't call.  The patient feels that too much stuff took her energy earlier in the year.  She said she feels the patient is being "selfish."  The patient noted that change is good.  The patient filled out applications and made appointments for people.  Her son is planning on moving to [place] to start a business.  Her  is from [place].  The whole family is considering moving to [place], [place].  Her school year starts on August 4th.  Her daughter will be in the tenth grade at [employer].  Her daughter is in dancing.  She has not been exercising.  She has gained weight.  She has gained 3-5 pounds.  She is sleeping well.  She plans on getting up and going to work when the school year starts.  She is happy that her phone rings less.  She has noticed that since she did not give them money they no longer want a relationship.    Treatment plan:  Target symptoms: anxiety   Why chosen therapy is appropriate versus another modality: relevant to diagnosis  Outcome monitoring methods: self-report, observation  Therapeutic intervention type: insight oriented psychotherapy, supportive psychotherapy, interactive psychotherapy     Risk parameters:  Patient reports no suicidal ideation  Patient reports no homicidal ideation  Patient reports no self-injurious behavior  Patient reports no violent behavior     Verbal deficits: None     Patient's response to intervention:  The patient's response to intervention is accepting, motivated.     Progress toward goals and other mental status changes:  The patient's progress toward goals is fair .     Diagnosis:   Generalized Anxiety Disorder     Plan:  individual psychotherapy     Return to clinic: as scheduled     Length of Service (minutes): 45        "

## 2023-07-31 NOTE — PROGRESS NOTES
"Established Patient - TeleHealth Visit    The patient location is: [place]  The chief complaint leading to consultation is: chronic pain     Visit type: audiovisual    Face to Face time with patient: 10-15 minutes  20 minutes of total time spent on the encounter, which includes face to face time and non-face to face time preparing to see the patient (eg, review of tests), Obtaining and/or reviewing separately obtained history, Documenting clinical information in the electronic or other health record, Independently interpreting results (not separately reported) and communicating results to the patient/family/caregiver, or Care coordination (not separately reported).     Each patient to whom he or she provides medical services by telemedicine is:  (1) informed of the relationship between the physician and patient and the respective role of any other health care provider with respect to management of the patient; and (2) notified that he or she may decline to receive medical services by telemedicine and may withdraw from such care at any time.        Chronic Pain -- Established Patient (Follow-up visit)  Subjective    Chief Pain Complaint:  Pain  Low back pain (on left) down LLE radicular pain to calf area  Neck pain radiating down BUE, L>R, into 4th and 5th finger numbness    Interval History (8/2/2023):  Magalis Russell presents today for follow-up visit.  Patient was last seen about 2 years ago. Patient was referred back to our clinic by Clotilde Honeycutt NP for "cervicalgia", although this is not her only complaint.  She c/o low back pain and neck, explaining it more of a soreness.  She has been taking naproxen from primary care, which provide some relief.  At the last visit with us about 2 years ago, she was started on Lyrica, which she never started taking.  Patient reports pain as 6/10 today.    Initial HPI (8/31/21):  This patient is a 35 y.o. female who presents today complaining of the above noted pain/s. Pain has " been present for a couple of years.  She was initially evaluated by PCP, and she attended physical therapy.  Physical therapy was during the COVID initially, so it was hard for her to complete in clinic sessions.  She continued at home exercises, which she feels was somewhat helpful.  She takes ibuprofen 800mg with some pain relief. She has taken gabapentin in the past, but it was too sedating for her to continue. Neck pain has been present, but low back pain and leg pain is now becoming more bothersome as well.    The patient describes this pain as follows.    - duration of pain: pain for years   - timing: intermittent   - character: burning, aching, numbness, tingling  - radiating, dermatomal: neck pain extends into left arm; low back pain extends into LLE at times  - antecedent trauma, prior spinal surgery: no prior trauma, no prior spinal surgery   - pertinent negatives: No fever, No chills, No weight loss, No bladder dysfunction, No bowel dysfunction, No saddle anesthesia  - pertinent positives: left arm weakness    - medications, other therapies tried (physical therapy, injections):     >> NSAIDs, gabapentin    >> Has previously undergone Physical Therapy    >> Has NOT previously undergone spinal injection/s    ______________________________________________________________________      IMAGING / Labs / Studies (reviewed on 8/2/2023):    9/01/2021 X-Ray Lumbar Complete Including Flex And Ext  COMPARISON:  None.  FINDINGS:  There is mild dextrocurvature of the lumbar spine.  The vertebral bodies demonstrate a normal height and alignment in the sagittal plane.  No subluxation seen on the flexion or extension views.  There appears to be some left-sided facet arthropathy at the L5-S1 level.  Multiple calcified pelvic phleboliths are noted.      6/10/20 MRI Cervical Spine Without Contrast  COMPARISON:  Cervical spine radiographs from 01/22/2020.    FINDINGS:  Straightening of normal cervical lordosis.  Discogenic  degenerative changes are seen with findings most severe at C5-C6 with mild to moderate disc space narrowing and adjacent marginal spur formation.  No fracture or listhesis or marrow replacement process.    Visualized cord demonstrates normal signal intensity.  Posterior fossa structures remain intact.    C2-C3: Unremarkable.    C3-C4: Unremarkable.    C4-C5: Unremarkable.    C5-C6: There is left paracentral disc protrusion versus  with indentation of the thecal sac.  The minimum AP spinal canal diameter measures 8.9 mm.  There is asymmetric right greater than left C5/6 neural foraminal narrowing.    C6-C7: Posterior disc protrusion with indentation of the thecal sac.  Minimum AP spinal canal diameter measures 8.6 mm.  No neural foraminal encroachment.    C7-T1: Unremarkable.    Impression  Discogenic degenerative changes as discussed above      1/22/2020 XR CERVICAL SPINE 2 OR 3 VIEWS   FINDINGS:  The vertebral bodies demonstrate a normal height and alignment.  There is mild-to-moderate disc space narrowing noted at C5-6 with more minimal disc space narrowing noted at C6-7.  No significant facet arthropathy suggested.      ______________________________________________________________________      Review of Systems:  CONSTITUTIONAL: patient denies any fever, chills, or weight loss  SKIN: patient denies any rash or itching  RESPIRATORY: patient denies having any shortness of breath  GASTROINTESTINAL: patient denies having any diarrhea, constipation, or bowel incontinence  GENITOURINARY: patient denies having any abnormal bladder function    MUSCULOSKELETAL:  - patient complains of the above noted pain/s (see chief pain complaint)    NEUROLOGICAL:   - pain as above  - strength in Upper extremities is decreased, on the LEFT  - sensation in Upper extremities is abnormal, on the LEFT  - patient denies any loss of bowel or bladder control      PSYCHIATRIC: patient denies any suicidal or homicidal  ideations    ______________________________________________________________________    Objective    Physical Exam:  Telemedicine Exam  Vitals:    08/02/23 0734   Resp: 17    There is no height or weight on file to calculate BMI.   (reviewed on 8/2/2023)     GENERAL: Well appearing, in no acute distress, alert and oriented x3.  Cooperative.  PSYCH:  Mood and affect appropriate.  SKIN: Skin color & texture normal. No visible rashes or lesions.  HEAD/FACE:  Normocephalic, atraumatic.    PULM:  No difficulty breathing. No nasal flaring. No obvious wheezing.  NECK: Limited ROM secondary to pain reproduction. Palpation over the cervical  paraspinous muscles causes pain. Some pain with flexion. Some pain with extension. Patient performed Facet loading causes pain bilaterally.  Patient performed spurling's Equivocal.   BACK: ROM fairly preserved. Patient performed Nataliia's + bilaterally, L>R. TTP over SIJ bilaterally, L>R. Some pain with extension. Some pain with flexion.  Patient performed facet loading causes pain.  EXTREMITIES: No deformities, edema, or skin discoloration. Moving all extremities well, symmetric strength throughout.  MUSCULOSKELETAL: No obvious atrophy abnormalities are noted.   NEURO: No obvious neurologic deficit.   GAIT: normal.     ______________________________________________________________________      Assessment    Assessment:  Magalis Russell is a 37 y.o. female who presents with    ICD-10-CM ICD-9-CM    1. Acute exacerbation of chronic low back pain  M54.50 724.2 methylPREDNISolone (MEDROL DOSEPACK) 4 mg tablet    G89.29 338.19      338.29       2. Cervicalgia  M54.2 723.1 Ambulatory referral/consult to Pain Clinic      Ambulatory referral/consult to Physical/Occupational Therapy      3. Lumbar spondylosis  M47.816 721.3 Ambulatory referral/consult to Pain Clinic      Ambulatory referral/consult to Physical/Occupational Therapy      4. Sacroiliitis  M46.1 720.2 Ambulatory referral/consult to Pain  Clinic      5. Muscle pain  M79.10 729.1 methocarbamoL (ROBAXIN) 500 MG Tab            Plan:  1. Interventional: Consider cervical JOSEP. Consider lumbar facet joint injection vs. SIJ injection.     2. Pharmacologic:   - Continue naproxen 500mg BID PRN - from PCP, which helps.   - Will prescribe Medrol Dose pack with instructions - for acute pain flare:  Day 1: 2 tablets before breakfast, 1 tablet after lunch, 1 tablet after dinner, 2 tablets at bedtime   Day 2: 1 tablet before breakfast, 1 tablet after lunch, 1 tablet after dinner, 2 tablets at bedtime   Day 3: 1 tablet before breakfast, 1 tablet after lunch, 1 tablet after dinner, 1 tablet at bedtime   Day 4: 1 tablet before breakfast, 1 tablet after lunch, 1 tablet at bedtime   Day 5: 1 tablet before breakfast, 1 tablet at bedtime   Day 6: 1 tablet before breakfast.   - Start Robaxin (methocarbamol) 500mg BID PRN muscle spasms (or can take 1/2 tablet).  she understands this could cause drowsiness.   - Consider Lyrica 75mg BID.       3. Rehabilitative:   - Patient continues at home physical therapy exercises often.   - Start PT with passive modalities, including ice and heat, and active modalities, including a regimen for stretching and strengthening.  Order sent to [facility] closer to her home.     4. Diagnostic:   - lumbar x-ray w/ flexion & extension - ordered last visit - reviewed.  - Consider ordering lumbar MRI if radicular pain complaints worsen.    5. Follow up: 6-8 week follow-up after physical therapy - will send online ticket scheduling on sfilatino - virtual visit     - This condition does not require this patient to take time off of work, and the primary goal of our Pain Management services is to improve the patient's functional capacity.   - I discussed the risks, benefits, and alternatives to potential treatment options. All questions and concerns were fully addressed today in clinic.         Avelina Rider PA-C  Interventional Pain  Management - [facility]    Disclaimer:  This note was prepared using voice recognition system and is likely to have sound alike errors that may have been overlooked even after proof reading.  Please call me with any questions.

## 2023-08-02 ENCOUNTER — OFFICE VISIT (OUTPATIENT)
Dept: PAIN MEDICINE | Facility: CLINIC | Age: 38
End: 2023-08-02
Payer: COMMERCIAL

## 2023-08-02 ENCOUNTER — PATIENT MESSAGE (OUTPATIENT)
Dept: PAIN MEDICINE | Facility: CLINIC | Age: 38
End: 2023-08-02

## 2023-08-02 VITALS — RESPIRATION RATE: 17 BRPM

## 2023-08-02 DIAGNOSIS — M79.10 MUSCLE PAIN: ICD-10-CM

## 2023-08-02 DIAGNOSIS — M54.2 CERVICALGIA: ICD-10-CM

## 2023-08-02 DIAGNOSIS — M54.50 ACUTE EXACERBATION OF CHRONIC LOW BACK PAIN: Primary | ICD-10-CM

## 2023-08-02 DIAGNOSIS — M46.1 SACROILIITIS: ICD-10-CM

## 2023-08-02 DIAGNOSIS — G89.29 ACUTE EXACERBATION OF CHRONIC LOW BACK PAIN: Primary | ICD-10-CM

## 2023-08-02 DIAGNOSIS — M47.816 LUMBAR SPONDYLOSIS: ICD-10-CM

## 2023-08-02 PROCEDURE — 3044F HG A1C LEVEL LT 7.0%: CPT | Mod: CPTII,95,, | Performed by: PHYSICIAN ASSISTANT

## 2023-08-02 PROCEDURE — 1159F MED LIST DOCD IN RCRD: CPT | Mod: CPTII,95,, | Performed by: PHYSICIAN ASSISTANT

## 2023-08-02 PROCEDURE — 1160F RVW MEDS BY RX/DR IN RCRD: CPT | Mod: CPTII,95,, | Performed by: PHYSICIAN ASSISTANT

## 2023-08-02 PROCEDURE — 99214 OFFICE O/P EST MOD 30 MIN: CPT | Mod: 95,,, | Performed by: PHYSICIAN ASSISTANT

## 2023-08-02 PROCEDURE — 1160F PR REVIEW ALL MEDS BY PRESCRIBER/CLIN PHARMACIST DOCUMENTED: ICD-10-PCS | Mod: CPTII,95,, | Performed by: PHYSICIAN ASSISTANT

## 2023-08-02 PROCEDURE — 1159F PR MEDICATION LIST DOCUMENTED IN MEDICAL RECORD: ICD-10-PCS | Mod: CPTII,95,, | Performed by: PHYSICIAN ASSISTANT

## 2023-08-02 PROCEDURE — 3044F PR MOST RECENT HEMOGLOBIN A1C LEVEL <7.0%: ICD-10-PCS | Mod: CPTII,95,, | Performed by: PHYSICIAN ASSISTANT

## 2023-08-02 PROCEDURE — 99214 PR OFFICE/OUTPT VISIT, EST, LEVL IV, 30-39 MIN: ICD-10-PCS | Mod: 95,,, | Performed by: PHYSICIAN ASSISTANT

## 2023-08-02 RX ORDER — METHOCARBAMOL 500 MG/1
500 TABLET, FILM COATED ORAL 2 TIMES DAILY PRN
Qty: 60 TABLET | Refills: 0 | Status: SHIPPED | OUTPATIENT
Start: 2023-08-02

## 2023-08-02 RX ORDER — METHYLPREDNISOLONE 4 MG/1
TABLET ORAL
Qty: 21 EACH | Refills: 0 | Status: SHIPPED | OUTPATIENT
Start: 2023-08-02 | End: 2024-03-08 | Stop reason: ALTCHOICE

## 2023-09-21 ENCOUNTER — HOSPITAL ENCOUNTER (OUTPATIENT)
Dept: RADIOLOGY | Facility: HOSPITAL | Age: 38
Discharge: HOME | End: 2023-09-21
Attending: NURSE PRACTITIONER
Payer: COMMERCIAL

## 2023-09-21 VITALS — WEIGHT: 187.81 LBS | HEIGHT: 67 IN | BODY MASS INDEX: 29.48 KG/M2

## 2023-09-21 DIAGNOSIS — Z12.31 BREAST CANCER SCREENING BY MAMMOGRAM: ICD-10-CM

## 2023-09-21 PROCEDURE — 77063 MAMMO DIGITAL SCREENING BILAT WITH TOMO: ICD-10-PCS | Mod: 26,,, | Performed by: RADIOLOGY

## 2023-09-21 PROCEDURE — 77067 SCR MAMMO BI INCL CAD: CPT | Mod: TC,PO

## 2023-09-21 PROCEDURE — 77063 BREAST TOMOSYNTHESIS BI: CPT | Mod: 26,,, | Performed by: RADIOLOGY

## 2023-09-21 PROCEDURE — 77067 SCR MAMMO BI INCL CAD: CPT | Mod: 26,,, | Performed by: RADIOLOGY

## 2023-09-21 PROCEDURE — 77067 MAMMO DIGITAL SCREENING BILAT WITH TOMO: ICD-10-PCS | Mod: 26,,, | Performed by: RADIOLOGY

## 2023-10-09 PROBLEM — Z00.00 ROUTINE ADULT HEALTH MAINTENANCE: Status: RESOLVED | Noted: 2018-09-26 | Resolved: 2023-10-09

## 2024-02-21 ENCOUNTER — ON-DEMAND VIRTUAL (OUTPATIENT)
Dept: URGENT CARE | Facility: CLINIC | Age: 39
End: 2024-02-21
Payer: COMMERCIAL

## 2024-02-21 DIAGNOSIS — B37.31 VULVOVAGINAL CANDIDIASIS: Primary | ICD-10-CM

## 2024-02-21 PROCEDURE — 99213 OFFICE O/P EST LOW 20 MIN: CPT | Mod: 95,,, | Performed by: PHYSICIAN ASSISTANT

## 2024-02-21 RX ORDER — FLUCONAZOLE 150 MG/1
150 TABLET ORAL DAILY
Qty: 2 TABLET | Refills: 0 | Status: SHIPPED | OUTPATIENT
Start: 2024-02-21 | End: 2024-02-22

## 2024-02-22 NOTE — PROGRESS NOTES
Subjective:      Patient ID: Magalis Russell is a 38 y.o. female.    Vitals:  vitals were not taken for this visit.     Chief Complaint: Female  Problem      Visit Type: TELE AUDIOVISUAL    Present with the patient at the time of consultation: TELEMED PRESENT WITH PATIENT: None    Past Medical History:   Diagnosis Date    Abnormal Pap smear of cervix     Back pain     Neck pain     Seasonal allergies     SVT (supraventricular tachycardia)     Vertigo      Past Surgical History:   Procedure Laterality Date    CERVICAL BIOPSY  W/ LOOP ELECTRODE EXCISION      LOOP ELECTROSURGICAL EXCISION PROCEDURE (LEEP)       Review of patient's allergies indicates:   Allergen Reactions    Egg white Blisters    Strawberry Shortness Of Breath and Blisters    Tomato (solanum lycopersicum) Shortness Of Breath and Blisters    Oranges [orange]     Wheat containing prod Hives     Current Outpatient Medications on File Prior to Visit   Medication Sig Dispense Refill    meclizine (ANTIVERT) 25 mg tablet Take 1 tablet (25 mg total) by mouth daily as needed. 20 tablet 0    methocarbamoL (ROBAXIN) 500 MG Tab Take 1 tablet (500 mg total) by mouth 2 (two) times daily as needed (muscle spasms). May cause drowsiness. 60 tablet 0    methylPREDNISolone (MEDROL DOSEPACK) 4 mg tablet use as directed 21 each 0    metoprolol succinate (TOPROL-XL) 25 MG 24 hr tablet Take 1 tablet (25 mg total) by mouth once daily. 90 tablet 3    montelukast (SINGULAIR) 10 mg tablet Take 10 mg by mouth nightly as needed.      naproxen (NAPROSYN) 500 MG tablet Take 1 tablet (500 mg total) by mouth 2 (two) times daily as needed (pain). 60 tablet 1    SPRINTEC, 28, 0.25-35 mg-mcg per tablet Take 1 tablet by mouth once daily. 90 tablet 3     No current facility-administered medications on file prior to visit.     Family History   Problem Relation Age of Onset    Heart failure Mother     Aneurysm Father     Breast cancer Sister 45    Colon cancer Neg Hx     Ovarian cancer Neg Hx      Thrombosis Neg Hx        Medications Ordered                [facility] - [place], [place] - [address] [address]   [address] [place] [address]    Telephone: [phone]   Fax: [phone]   Hours: Not open 24 hours                         E-Prescribed (1 of 1)              fluconazole (DIFLUCAN) 150 MG Tab    Sig: Take 1 tablet (150 mg total) by mouth once daily. May repeat second dose in 72 hours if not improved completely. for 1 day       Start: 2/21/24     Quantity: 2 tablet Refills: 0                           Ohs Peq Odvv Intake    2/21/2024 10:02 PM CST - Filed by Patient   What is your current physical address in the event of a medical emergency? [address] [place] [address]   Are you able to take your vital signs? No   Please attach any relevant images or files          HPI  39yo female presents with c/o thick clumpy white cottage cheese like discharge, external vaginal irritation and itching x 3-4 days.    Hx of yeast infections, but has been about 8 months though. Denies POP or STD risk.        Constitution: Negative for chills and fever.   Gastrointestinal:  Negative for abdominal pain, nausea and vomiting.   Genitourinary:  Positive for vaginal discharge. Negative for dysuria, frequency, urgency, hematuria, vaginal bleeding and pelvic pain.        Objective:   The physical exam was conducted virtually.  Physical Exam   Constitutional: She is oriented to person, place, and time.  Non-toxic appearance. She does not appear ill. No distress.   HENT:   Head: Normocephalic and atraumatic.   Pulmonary/Chest: Effort normal. No respiratory distress. She has no wheezes.   Abdominal: Normal appearance. Soft. There is no abdominal tenderness.   Neurological: She is alert and oriented to person, place, and time. Coordination normal.   Skin: Skin is not diaphoretic, not pale and no rash.   Psychiatric: Her behavior is normal. Judgment and thought content normal.        Assessment:     1. Vulvovaginal candidiasis        Plan:       Vulvovaginal candidiasis    Other orders  -     fluconazole (DIFLUCAN) 150 MG Tab; Take 1 tablet (150 mg total) by mouth once daily. May repeat second dose in 72 hours if not improved completely. for 1 day  Dispense: 2 tablet; Refill: 0      1. Prescription has been sent to your pharmacy, please take as directed.   2. Wear loose fitting clothes and avoid touching/itching area. Keep area clean and dry.   3. Recheck in 4-5 days at local urgent care if no improvement sooner if worsening pain, fevers, vomiting.   4. You must understand that you've received a Telehealth Urgent Care treatment only and that you may be released before all your medical problems are known or treated. You, the patient, will arrange for follow up care as instructed.     Verbally discussed plan and patient confirms understanding.

## 2024-02-22 NOTE — PATIENT INSTRUCTIONS
1. Prescription has been sent to your pharmacy, please take as directed.   2. Wear loose fitting clothes and avoid touching/itching area. Keep area clean and dry.   3. Recheck in 4-5 days at local urgent care if no improvement sooner if worsening pain, fevers, vomiting.   4. You must understand that you've received a Telehealth Urgent Care treatment only and that you may be released before all your medical problems are known or treated. You, the patient, will arrange for follow up care as instructed.

## 2024-03-08 ENCOUNTER — OFFICE VISIT (OUTPATIENT)
Dept: INTERNAL MEDICINE | Facility: CLINIC | Age: 39
End: 2024-03-08
Payer: COMMERCIAL

## 2024-03-08 DIAGNOSIS — F41.1 GAD (GENERALIZED ANXIETY DISORDER): Primary | ICD-10-CM

## 2024-03-08 PROCEDURE — 1160F RVW MEDS BY RX/DR IN RCRD: CPT | Mod: CPTII,95,, | Performed by: NURSE PRACTITIONER

## 2024-03-08 PROCEDURE — 1159F MED LIST DOCD IN RCRD: CPT | Mod: CPTII,95,, | Performed by: NURSE PRACTITIONER

## 2024-03-08 PROCEDURE — 99213 OFFICE O/P EST LOW 20 MIN: CPT | Mod: 95,,, | Performed by: NURSE PRACTITIONER

## 2024-03-08 RX ORDER — FLUOXETINE HYDROCHLORIDE 20 MG/1
20 CAPSULE ORAL DAILY
Qty: 30 CAPSULE | Refills: 5 | Status: SHIPPED | OUTPATIENT
Start: 2024-03-08 | End: 2024-04-16 | Stop reason: SDUPTHER

## 2024-03-08 NOTE — PROGRESS NOTES
Subjective:       Patient ID: Magalis Russell is a 38 y.o. female.    Chief Complaint: No chief complaint on file.    The patient location is: [place]  The chief complaint leading to consultation is: anxiety     Visit type: audiovisual    Face to Face time with patient: 12 minutes  18 minutes of total time spent on the encounter, which includes face to face time and non-face to face time preparing to see the patient (eg, review of tests), Obtaining and/or reviewing separately obtained history, Documenting clinical information in the electronic or other health record, Independently interpreting results (not separately reported) and communicating results to the patient/family/caregiver, or Care coordination (not separately reported).     Each patient to whom he or she provides medical services by telemedicine is:  (1) informed of the relationship between the physician and patient and the respective role of any other health care provider with respect to management of the patient; and (2) notified that he or she may decline to receive medical services by telemedicine and may withdraw from such care at any time.    Notes:   Mrs. Russell presents to visit for complaint of increased anxiety/stressors. Onset approx 3-4 months. Believes it is mostly related to work. 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 Feels overwhelmed. Has been with same job for 14+ years. A lot of behavior problems in her class and not enough support with administration. Would like to discuss possible FMLA for mental health. Has previously seen counselor, plans to try and schedule appt. Also was on prozac in past, did find that it helped.       Patient Active Problem List   Diagnosis    History of cervical dysplasia    Palpitations    Lower extremity pain, anterior, left    History of supraventricular tachycardia    Cervicalgia    Numbness and tingling    ALONDRA (generalized anxiety disorder)       Family History   Problem Relation Age of Onset    Heart failure Mother      Aneurysm Father     Breast cancer Sister 45    Colon cancer Neg Hx     Ovarian cancer Neg Hx     Thrombosis Neg Hx      Past Surgical History:   Procedure Laterality Date    CERVICAL BIOPSY  W/ LOOP ELECTRODE EXCISION      LOOP ELECTROSURGICAL EXCISION PROCEDURE (LEEP)           Current Outpatient Medications:     FLUoxetine 20 MG capsule, Take 1 capsule (20 mg total) by mouth once daily., Disp: 30 capsule, Rfl: 5    meclizine (ANTIVERT) 25 mg tablet, Take 1 tablet (25 mg total) by mouth daily as needed., Disp: 20 tablet, Rfl: 0    methocarbamoL (ROBAXIN) 500 MG Tab, Take 1 tablet (500 mg total) by mouth 2 (two) times daily as needed (muscle spasms). May cause drowsiness., Disp: 60 tablet, Rfl: 0    metoprolol succinate (TOPROL-XL) 25 MG 24 hr tablet, Take 1 tablet (25 mg total) by mouth once daily., Disp: 90 tablet, Rfl: 3    montelukast (SINGULAIR) 10 mg tablet, Take 10 mg by mouth nightly as needed., Disp: , Rfl:     naproxen (NAPROSYN) 500 MG tablet, Take 1 tablet (500 mg total) by mouth 2 (two) times daily as needed (pain)., Disp: 60 tablet, Rfl: 1    SPRINTEC, 28, 0.25-35 mg-mcg per tablet, Take 1 tablet by mouth once daily., Disp: 90 tablet, Rfl: 3    Review of Systems   Constitutional:  Positive for fatigue. Negative for activity change and unexpected weight change.   HENT:  Negative for hearing loss, rhinorrhea and trouble swallowing.    Eyes:  Negative for discharge and visual disturbance.   Respiratory:  Negative for chest tightness and wheezing.    Cardiovascular:  Negative for chest pain and palpitations.   Gastrointestinal:  Negative for blood in stool, constipation, diarrhea and vomiting.   Endocrine: Negative for polydipsia and polyuria.   Genitourinary:  Negative for difficulty urinating, dysuria, hematuria and menstrual problem.   Musculoskeletal:  Negative for arthralgias, joint swelling and neck pain.   Neurological:  Negative for weakness and headaches.   Psychiatric/Behavioral:   "Positive for dysphoric mood. Negative for confusion and sleep disturbance. The patient is nervous/anxious.        Objective:   There were no vitals taken for this visit.     Physical Exam  Constitutional:       General: She is not in acute distress.     Appearance: Normal appearance. She is not ill-appearing.   Cardiovascular:      Rate and Rhythm: Normal rate.   Neurological:      Mental Status: She is alert and oriented to person, place, and time.   Psychiatric:         Mood and Affect: Mood is anxious. Mood is not depressed. Affect is tearful.         Speech: Speech normal.         Behavior: Behavior is cooperative.       Assessment & Plan     Problem List Items Addressed This Visit          Psychiatric    ALONDRA (generalized anxiety disorder) - Primary    Relevant Medications    FLUoxetine 20 MG capsule    Plans to restart counseling.   Rx for prozac. No problems in past when on medication.   Will complete FMLA form once received.     Follow up in about 1 month (around 4/8/2024) for anxiety.            Portions of this note may have been created with voice recognition software. Occasional "wrong-word" or "sound-a-like" substitutions may have occurred due to the inherent limitations of voice recognition software. Please, read the note carefully and recognize, using context, where substitutions have occurred.       "

## 2024-04-16 DIAGNOSIS — M47.816 LUMBAR SPONDYLOSIS: ICD-10-CM

## 2024-04-16 DIAGNOSIS — F41.1 GAD (GENERALIZED ANXIETY DISORDER): ICD-10-CM

## 2024-04-16 DIAGNOSIS — Z30.41 ENCOUNTER FOR SURVEILLANCE OF CONTRACEPTIVE PILLS: ICD-10-CM

## 2024-04-16 DIAGNOSIS — M46.1 SACROILIITIS: ICD-10-CM

## 2024-04-16 DIAGNOSIS — Z86.79 HISTORY OF SUPRAVENTRICULAR TACHYCARDIA: ICD-10-CM

## 2024-04-16 DIAGNOSIS — R00.2 PALPITATIONS: ICD-10-CM

## 2024-04-17 RX ORDER — NORGESTIMATE AND ETHINYL ESTRADIOL 0.25-0.035
1 KIT ORAL DAILY
Qty: 90 TABLET | Refills: 3 | Status: SHIPPED | OUTPATIENT
Start: 2024-04-17

## 2024-04-17 RX ORDER — NAPROXEN 500 MG/1
500 TABLET ORAL 2 TIMES DAILY PRN
Qty: 60 TABLET | Refills: 1 | Status: SHIPPED | OUTPATIENT
Start: 2024-04-17

## 2024-04-17 RX ORDER — FLUOXETINE HYDROCHLORIDE 20 MG/1
20 CAPSULE ORAL DAILY
Qty: 30 CAPSULE | Refills: 5 | Status: SHIPPED | OUTPATIENT
Start: 2024-04-17

## 2024-04-17 RX ORDER — METOPROLOL SUCCINATE 25 MG/1
25 TABLET, EXTENDED RELEASE ORAL DAILY
Qty: 90 TABLET | Refills: 3 | Status: SHIPPED | OUTPATIENT
Start: 2024-04-17

## 2024-04-17 NOTE — TELEPHONE ENCOUNTER
Refill Routing Note   Medication(s) are not appropriate for processing by [facility] for the following reason(s):        Non-participating provider    [facility] action(s):  Route               Appointments  past 12m or future 3m with PCP    Date Provider   Last Visit   3/8/2024 Clotilde Honeycutt NP   Next Visit   Visit date not found Clotilde Honeycutt NP   ED visits in past 90 days: 0        Note composed:11:23 PM 04/16/2024

## 2024-07-16 ENCOUNTER — OFFICE VISIT (OUTPATIENT)
Dept: INTERNAL MEDICINE | Facility: CLINIC | Age: 39
End: 2024-07-16
Payer: COMMERCIAL

## 2024-07-16 VITALS
HEART RATE: 98 BPM | OXYGEN SATURATION: 99 % | HEIGHT: 67 IN | BODY MASS INDEX: 30.45 KG/M2 | TEMPERATURE: 97 F | SYSTOLIC BLOOD PRESSURE: 132 MMHG | WEIGHT: 194 LBS | DIASTOLIC BLOOD PRESSURE: 84 MMHG

## 2024-07-16 DIAGNOSIS — Z87.410 HISTORY OF CERVICAL DYSPLASIA: ICD-10-CM

## 2024-07-16 DIAGNOSIS — F41.1 GAD (GENERALIZED ANXIETY DISORDER): ICD-10-CM

## 2024-07-16 DIAGNOSIS — Z12.4 CERVICAL CANCER SCREENING: ICD-10-CM

## 2024-07-16 DIAGNOSIS — Z01.419 WELL WOMAN EXAM WITH ROUTINE GYNECOLOGICAL EXAM: Primary | ICD-10-CM

## 2024-07-16 PROCEDURE — 99396 PREV VISIT EST AGE 40-64: CPT | Mod: S$GLB,,, | Performed by: NURSE PRACTITIONER

## 2024-07-16 PROCEDURE — 99999 PR PBB SHADOW E&M-EST. PATIENT-LVL IV: CPT | Mod: PBBFAC,,, | Performed by: NURSE PRACTITIONER

## 2024-07-16 PROCEDURE — 3079F DIAST BP 80-89 MM HG: CPT | Mod: CPTII,S$GLB,, | Performed by: NURSE PRACTITIONER

## 2024-07-16 PROCEDURE — 3008F BODY MASS INDEX DOCD: CPT | Mod: CPTII,S$GLB,, | Performed by: NURSE PRACTITIONER

## 2024-07-16 PROCEDURE — 1160F RVW MEDS BY RX/DR IN RCRD: CPT | Mod: CPTII,S$GLB,, | Performed by: NURSE PRACTITIONER

## 2024-07-16 PROCEDURE — 87624 HPV HI-RISK TYP POOLED RSLT: CPT | Performed by: NURSE PRACTITIONER

## 2024-07-16 PROCEDURE — 3075F SYST BP GE 130 - 139MM HG: CPT | Mod: CPTII,S$GLB,, | Performed by: NURSE PRACTITIONER

## 2024-07-16 PROCEDURE — 1159F MED LIST DOCD IN RCRD: CPT | Mod: CPTII,S$GLB,, | Performed by: NURSE PRACTITIONER

## 2024-07-16 NOTE — PROGRESS NOTES
Subjective:       Patient ID: Magalis Russell is a 38 y.o. female.    Chief Complaint: Gynecologic Exam    Mrs. Russell presents to visit for well woman exam. Upcoming mammo in 2024. Hx of abnormal pap with LEEP procedure. No acute complaints today.     Gynecologic Exam  The patient's pertinent negatives include no genital itching, genital lesions, genital odor, genital rash, missed menses, pelvic pain, vaginal bleeding or vaginal discharge. Pertinent negatives include no abdominal pain, chills, constipation, diarrhea, dysuria, fever, flank pain, frequency, headaches, hematuria, nausea, painful intercourse, sore throat or vomiting. She is sexually active. She uses oral contraceptives for contraception. Her menstrual history has been regular. There is no history of an abdominal surgery, a  section, a gynecological surgery, herpes simplex, menorrhagia, metrorrhagia, PID, an STD or vaginosis.       Patient Active Problem List   Diagnosis    History of cervical dysplasia    Palpitations    Lower extremity pain, anterior, left    History of supraventricular tachycardia    Cervicalgia    Numbness and tingling    ALONDRA (generalized anxiety disorder)       Family History   Problem Relation Name Age of Onset    Heart failure Mother      Aneurysm Father      Breast cancer Sister half sister 45    Colon cancer Neg Hx      Ovarian cancer Neg Hx      Thrombosis Neg Hx       Past Surgical History:   Procedure Laterality Date    CERVICAL BIOPSY  W/ LOOP ELECTRODE EXCISION      LOOP ELECTROSURGICAL EXCISION PROCEDURE (LEEP)           Current Outpatient Medications:     FLUoxetine 20 MG capsule, Take 1 capsule (20 mg total) by mouth once daily., Disp: 30 capsule, Rfl: 5    meclizine (ANTIVERT) 25 mg tablet, Take 1 tablet (25 mg total) by mouth daily as needed., Disp: 20 tablet, Rfl: 0    methocarbamoL (ROBAXIN) 500 MG Tab, Take 1 tablet (500 mg total) by mouth 2 (two) times daily as needed (muscle spasms). May cause drowsiness.,  "Disp: 60 tablet, Rfl: 0    metoprolol succinate (TOPROL-XL) 25 MG 24 hr tablet, Take 1 tablet (25 mg total) by mouth once daily., Disp: 90 tablet, Rfl: 3    montelukast (SINGULAIR) 10 mg tablet, Take 10 mg by mouth nightly as needed., Disp: , Rfl:     naproxen (NAPROSYN) 500 MG tablet, Take 1 tablet (500 mg total) by mouth 2 (two) times daily as needed (pain)., Disp: 60 tablet, Rfl: 1    SPRINTEC, 28, 0.25-35 mg-mcg per tablet, Take 1 tablet by mouth once daily., Disp: 90 tablet, Rfl: 3    Review of Systems   Constitutional:  Negative for activity change, chills, fever and unexpected weight change.   HENT:  Negative for hearing loss, rhinorrhea, sore throat and trouble swallowing.    Eyes:  Negative for discharge and visual disturbance.   Respiratory:  Negative for chest tightness, shortness of breath and wheezing.    Cardiovascular:  Negative for chest pain and palpitations.   Gastrointestinal:  Negative for abdominal pain, blood in stool, constipation, diarrhea, nausea and vomiting.   Endocrine: Negative for polydipsia and polyuria.   Genitourinary:  Negative for decreased urine volume, difficulty urinating, dysuria, flank pain, frequency, hematuria, menorrhagia, menstrual problem, missed menses, pelvic pain, vaginal bleeding and vaginal discharge.   Musculoskeletal:  Negative for arthralgias, joint swelling and neck pain.   Neurological:  Negative for weakness and headaches.   Psychiatric/Behavioral:  Negative for confusion and dysphoric mood.        Objective:   /84 (BP Location: Left arm, Patient Position: Sitting, BP Method: Medium (Manual))   Pulse 98   Temp 97 °F (36.1 °C) (Tympanic)   Ht 5' 7" (1.702 m)   Wt 88 kg (194 lb 0.1 oz)   SpO2 99%   BMI 30.39 kg/m²      Physical Exam  Constitutional:       General: She is not in acute distress.     Appearance: Normal appearance. She is not ill-appearing.   Cardiovascular:      Rate and Rhythm: Normal rate and regular rhythm.      Pulses: Normal pulses. "      Heart sounds: Normal heart sounds. No murmur heard.     No friction rub. No gallop.   Pulmonary:      Effort: Pulmonary effort is normal. No respiratory distress.      Breath sounds: Normal breath sounds. No wheezing.   Abdominal:      Palpations: Abdomen is soft.      Tenderness: There is no abdominal tenderness. There is no right CVA tenderness, left CVA tenderness or guarding.      Hernia: There is no hernia in the left inguinal area or right inguinal area.   Genitourinary:     General: Normal vulva.      Exam position: Supine.      Pubic Area: No rash or pubic lice.       Labia:         Right: No rash, tenderness, lesion or injury.         Left: No rash, tenderness, lesion or injury.       Vagina: Normal. No vaginal discharge or tenderness.      Cervix: Friability and cervical bleeding (with pap) present. No cervical motion tenderness or discharge.      Uterus: Not enlarged and not tender.       Adnexa:         Right: No mass or tenderness.          Left: No mass or tenderness.        Rectum: Normal.         Lymphadenopathy:      Lower Body: No right inguinal adenopathy. No left inguinal adenopathy.   Skin:     General: Skin is warm and dry.      Coloration: Skin is not pale.      Findings: No erythema.   Neurological:      Mental Status: She is alert and oriented to person, place, and time.   Psychiatric:         Mood and Affect: Mood normal.         Behavior: Behavior normal.         Assessment & Plan     Problem List Items Addressed This Visit          Psychiatric    ALONDRA (generalized anxiety disorder)    Current Assessment & Plan     Stable on current dose.            Renal/    History of cervical dysplasia    Overview     S/p LEEP 3/1/2019, SINDY 2-3 with positive margins.  Repeat pap and ECC in September both negative         Current Assessment & Plan     Repeating HPV and Pap today. Inflamed abn tissue noted on exam. Will await pap results.           Other Visit Diagnoses       Well woman exam with  "routine gynecological exam    -  Primary    Relevant Orders    HPV High Risk Genotypes, PCR    Liquid-Based Pap Smear, Screening    Cervical cancer screening        Relevant Orders    HPV High Risk Genotypes, PCR    Liquid-Based Pap Smear, Screening            Follow up if symptoms worsen or fail to improve.          Portions of this note may have been created with voice recognition software. Occasional "wrong-word" or "sound-a-like" substitutions may have occurred due to the inherent limitations of voice recognition software. Please, read the note carefully and recognize, using context, where substitutions have occurred.       "

## 2024-07-22 LAB
HPV HR 12 DNA SPEC QL NAA+PROBE: NEGATIVE
HPV16 AG SPEC QL: NEGATIVE
HPV18 DNA SPEC QL NAA+PROBE: NEGATIVE

## 2024-10-07 ENCOUNTER — HOSPITAL ENCOUNTER (OUTPATIENT)
Dept: RADIOLOGY | Facility: HOSPITAL | Age: 39
Discharge: HOME | End: 2024-10-07
Attending: NURSE PRACTITIONER
Payer: COMMERCIAL

## 2024-10-07 VITALS — HEIGHT: 67 IN | WEIGHT: 194 LBS | BODY MASS INDEX: 30.45 KG/M2

## 2024-10-07 DIAGNOSIS — Z12.31 ENCOUNTER FOR SCREENING MAMMOGRAM FOR BREAST CANCER: ICD-10-CM

## 2024-10-07 PROCEDURE — 77063 BREAST TOMOSYNTHESIS BI: CPT | Mod: 26,,, | Performed by: STUDENT IN AN ORGANIZED HEALTH CARE EDUCATION/TRAINING PROGRAM

## 2024-10-07 PROCEDURE — 77067 SCR MAMMO BI INCL CAD: CPT | Mod: 26,,, | Performed by: STUDENT IN AN ORGANIZED HEALTH CARE EDUCATION/TRAINING PROGRAM

## 2024-10-07 PROCEDURE — 77067 SCR MAMMO BI INCL CAD: CPT | Mod: TC,PO

## 2024-11-04 ENCOUNTER — E-VISIT (OUTPATIENT)
Dept: INTERNAL MEDICINE | Facility: CLINIC | Age: 39
End: 2024-11-04
Payer: COMMERCIAL

## 2024-11-04 DIAGNOSIS — N89.8 VAGINAL DISCHARGE: Primary | ICD-10-CM

## 2024-11-06 ENCOUNTER — E-VISIT (OUTPATIENT)
Dept: FAMILY MEDICINE | Facility: CLINIC | Age: 39
End: 2024-11-06
Payer: COMMERCIAL

## 2024-11-06 ENCOUNTER — PATIENT MESSAGE (OUTPATIENT)
Dept: INTERNAL MEDICINE | Facility: CLINIC | Age: 39
End: 2024-11-06

## 2024-11-06 DIAGNOSIS — N76.0 BV (BACTERIAL VAGINOSIS): Primary | ICD-10-CM

## 2024-11-06 DIAGNOSIS — B96.89 BV (BACTERIAL VAGINOSIS): Primary | ICD-10-CM

## 2024-11-06 RX ORDER — METRONIDAZOLE 500 MG/1
500 TABLET ORAL EVERY 12 HOURS
Qty: 14 TABLET | Refills: 0 | Status: SHIPPED | OUTPATIENT
Start: 2024-11-06 | End: 2024-11-13

## 2024-11-06 NOTE — PROGRESS NOTES
Patient ID: Magalis Russell is a 39 y.o. female.    Chief Complaint: General Illness (Entered automatically based on patient selection in [employer].)    The patient initiated a request through [employer] on 11/6/2024 for evaluation and management with a chief complaint of General Illness (Entered automatically based on patient selection in [employer].)     I evaluated the questionnaire submission on 11/6/24.    [facility]    11/6/2024  9:06 AM CST - Filed by Patient   What do you need help with? Bacterial Vaginosis   Do you agree to participate in an E-Visit? Yes   If you have any of the following symptoms,  please do not complete an E-Visit,  schedule an appointment with your provider: I acknowledge   Select all that apply: None of the above   What is the main issue you would like addressed today? Bacterial Vaginosis   Which of the following vaginal concerns do you have? Discharge   Do you have vaginal discharge? White/milky discharge   Do you have pain while passing urine? No   Do you have any of the following symptoms? None of the above    Have you taken antibiotics in the last two weeks? No    Do you use any of the following? Bubble baths   Which of the following applies to your menstrual period? Expect soon   Which of the following applies to your menstrual cycle? Normal amount of bleeding   Do you have spotting between periods? No   Do you have pain with your period? No   Have you had similar symptoms in the past? No   Have you had a temperature of 100.4 or higher? No   Provide any additional information you feel is important. Bacterial Vaginosis   Please attach any relevant images or files    Are you able to take your vital signs? No         Encounter Diagnosis   Name Primary?    BV (bacterial vaginosis) Yes        No orders of the defined types were placed in this encounter.     Medications Ordered This Encounter   Medications    metroNIDAZOLE (FLAGYL) 500 MG tablet     Sig: Take 1 tablet  (500 mg total) by mouth every 12 (twelve) hours. for 7 days     Dispense:  14 tablet     Refill:  0        No follow-ups on file.      E-Visit Time Tracking:    Day 1 Time (in minutes): 8    Total Time (in minutes): 8

## 2024-11-06 NOTE — PROGRESS NOTES
Patient ID: Magalis Russell is a 39 y.o. female.    Chief Complaint: General Illness (Entered automatically based on patient selection in [employer].)    The patient initiated a request through [employer] on 11/4/2024 for evaluation and management with a chief complaint of General Illness (Entered automatically based on patient selection in [employer].)     I evaluated the questionnaire submission on 11/6/2024.    [facility] P[facility]visit [facility]    11/4/2024  9:47 PM CST - Filed by Patient   What do you need help with? Bacterial Vaginosis   Do you agree to participate in an E-Visit? Yes   If you have any of the following symptoms,  please do not complete an E-Visit,  schedule an appointment with your provider: I acknowledge   Select all that apply: None of the above   What is the main issue you would like addressed today? Bacterial vaginosis   Which of the following vaginal concerns do you have? Discharge   Do you have vaginal discharge? White/milky discharge   Do you have pain while passing urine? No   Do you have any of the following symptoms? None of the above    Have you taken antibiotics in the last two weeks? No    Do you use any of the following? Bubble baths   Which of the following applies to your menstrual period? Expect soon   Which of the following applies to your menstrual cycle? Normal amount of bleeding   Do you have spotting between periods? No   Do you have pain with your period? No   Have you had similar symptoms in the past? No   Have you had a temperature of 100.4 or higher? No   Provide any additional information you feel is important.    Please attach any relevant images or files    Are you able to take your vital signs? No         Encounter Diagnosis   Name Primary?    Vaginal discharge Yes        Orders Placed This Encounter   Procedures    Vaginosis Screen by DNA Probe           Standing Status:   Future     Standing Expiration Date:   1/5/2026     Order Specific Question:   Release to  patient     Answer:   Immediate     Order Specific Question:   Send normal result to authorizing provider's In Basket if patient is active on MyChart:     Answer:   Yes            No follow-ups on file.      E-Visit Time Tracking:    Day 1 Time (in minutes): 5    Total Time (in minutes): 5

## 2025-04-14 ENCOUNTER — OFFICE VISIT (OUTPATIENT)
Dept: PSYCHIATRY | Facility: CLINIC | Age: 40
End: 2025-04-14
Payer: COMMERCIAL

## 2025-04-14 DIAGNOSIS — Z30.41 ENCOUNTER FOR SURVEILLANCE OF CONTRACEPTIVE PILLS: ICD-10-CM

## 2025-04-14 DIAGNOSIS — F41.1 GENERALIZED ANXIETY DISORDER: Primary | ICD-10-CM

## 2025-04-14 PROCEDURE — 90834 PSYTX W PT 45 MINUTES: CPT | Mod: 95,,, | Performed by: SOCIAL WORKER
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
## 2025-04-15 RX ORDER — NORGESTIMATE AND ETHINYL ESTRADIOL 0.25-0.035
1 KIT ORAL DAILY
Qty: 84 TABLET | Refills: 4 | Status: SHIPPED | OUTPATIENT
Start: 2025-04-15

## 2025-05-15 DIAGNOSIS — Z86.79 HISTORY OF SUPRAVENTRICULAR TACHYCARDIA: ICD-10-CM

## 2025-05-15 DIAGNOSIS — R00.2 PALPITATIONS: ICD-10-CM

## 2025-05-16 RX ORDER — METOPROLOL SUCCINATE 25 MG/1
25 TABLET, EXTENDED RELEASE ORAL
Qty: 90 TABLET | Refills: 3 | Status: SHIPPED | OUTPATIENT
Start: 2025-05-16